# Patient Record
Sex: MALE | Race: WHITE | NOT HISPANIC OR LATINO
[De-identification: names, ages, dates, MRNs, and addresses within clinical notes are randomized per-mention and may not be internally consistent; named-entity substitution may affect disease eponyms.]

---

## 2018-10-26 ENCOUNTER — APPOINTMENT (OUTPATIENT)
Dept: OPHTHALMOLOGY | Facility: CLINIC | Age: 73
End: 2018-10-26
Payer: MEDICARE

## 2018-10-26 DIAGNOSIS — H52.202 UNSPECIFIED ASTIGMATISM, LEFT EYE: ICD-10-CM

## 2018-10-26 DIAGNOSIS — M19.90 UNSPECIFIED OSTEOARTHRITIS, UNSPECIFIED SITE: ICD-10-CM

## 2018-10-26 DIAGNOSIS — H49.21 SIXTH [ABDUCENT] NERVE PALSY, RIGHT EYE: ICD-10-CM

## 2018-10-26 DIAGNOSIS — H53.2 DIPLOPIA: ICD-10-CM

## 2018-10-26 DIAGNOSIS — H52.4 PRESBYOPIA: ICD-10-CM

## 2018-10-26 DIAGNOSIS — Z78.9 OTHER SPECIFIED HEALTH STATUS: ICD-10-CM

## 2018-10-26 DIAGNOSIS — H52.03 HYPERMETROPIA, BILATERAL: ICD-10-CM

## 2018-10-26 PROCEDURE — 99204 OFFICE O/P NEW MOD 45 MIN: CPT

## 2018-10-26 RX ORDER — PREDNISONE 1 MG/1
1 TABLET ORAL
Qty: 90 | Refills: 0 | Status: ACTIVE | COMMUNITY
Start: 2018-05-23

## 2018-10-26 RX ORDER — PREDNISONE 5 MG/1
5 TABLET ORAL
Qty: 90 | Refills: 0 | Status: ACTIVE | COMMUNITY
Start: 2018-07-10

## 2018-10-26 RX ORDER — DULOXETINE HYDROCHLORIDE 60 MG/1
60 CAPSULE, DELAYED RELEASE PELLETS ORAL
Qty: 90 | Refills: 0 | Status: ACTIVE | COMMUNITY
Start: 2018-08-01

## 2018-10-26 RX ORDER — RABEPRAZOLE SODIUM 20 MG/1
20 TABLET, DELAYED RELEASE ORAL
Qty: 60 | Refills: 0 | Status: ACTIVE | COMMUNITY
Start: 2018-08-02

## 2018-10-26 RX ORDER — FOLIC ACID-PYRIDOXINE-CYANOCOBALAMIN TAB 2.5-25-2 MG 2.5-25-2 MG
2.5-25-2 TAB ORAL
Qty: 90 | Refills: 0 | Status: ACTIVE | COMMUNITY
Start: 2018-06-21

## 2018-10-26 RX ORDER — TAMSULOSIN HYDROCHLORIDE 0.4 MG/1
0.4 CAPSULE ORAL
Qty: 90 | Refills: 0 | Status: ACTIVE | COMMUNITY
Start: 2018-05-16

## 2018-10-30 ENCOUNTER — APPOINTMENT (OUTPATIENT)
Dept: CARDIOLOGY | Facility: CLINIC | Age: 73
End: 2018-10-30
Payer: MEDICARE

## 2018-10-30 ENCOUNTER — NON-APPOINTMENT (OUTPATIENT)
Age: 73
End: 2018-10-30

## 2018-10-30 VITALS
HEIGHT: 73 IN | WEIGHT: 236 LBS | OXYGEN SATURATION: 96 % | BODY MASS INDEX: 31.28 KG/M2 | HEART RATE: 69 BPM | DIASTOLIC BLOOD PRESSURE: 79 MMHG | SYSTOLIC BLOOD PRESSURE: 155 MMHG

## 2018-10-30 PROCEDURE — 93000 ELECTROCARDIOGRAM COMPLETE: CPT

## 2018-10-30 PROCEDURE — 99205 OFFICE O/P NEW HI 60 MIN: CPT

## 2018-10-30 RX ORDER — CHROMIUM 200 MCG
1000 TABLET ORAL
Refills: 0 | Status: COMPLETED | COMMUNITY

## 2018-10-30 RX ORDER — IBANDRONATE SODIUM 150 MG/1
150 TABLET, FILM COATED ORAL
Refills: 0 | Status: COMPLETED | COMMUNITY

## 2018-11-19 ENCOUNTER — APPOINTMENT (OUTPATIENT)
Dept: CARDIOLOGY | Facility: CLINIC | Age: 73
End: 2018-11-19
Payer: MEDICARE

## 2018-11-19 PROCEDURE — 93306 TTE W/DOPPLER COMPLETE: CPT

## 2018-11-26 ENCOUNTER — APPOINTMENT (OUTPATIENT)
Dept: CARDIOLOGY | Facility: CLINIC | Age: 73
End: 2018-11-26
Payer: MEDICARE

## 2018-11-26 PROCEDURE — 93015 CV STRESS TEST SUPVJ I&R: CPT

## 2018-11-26 PROCEDURE — 78452 HT MUSCLE IMAGE SPECT MULT: CPT

## 2018-11-26 PROCEDURE — A9500: CPT

## 2018-11-30 DIAGNOSIS — R94.39 ABNORMAL RESULT OF OTHER CARDIOVASCULAR FUNCTION STUDY: ICD-10-CM

## 2018-12-10 ENCOUNTER — FORM ENCOUNTER (OUTPATIENT)
Age: 73
End: 2018-12-10

## 2018-12-11 ENCOUNTER — OUTPATIENT (OUTPATIENT)
Dept: OUTPATIENT SERVICES | Facility: HOSPITAL | Age: 73
LOS: 1 days | End: 2018-12-11
Payer: MEDICARE

## 2018-12-11 ENCOUNTER — APPOINTMENT (OUTPATIENT)
Dept: CARDIOLOGY | Facility: CLINIC | Age: 73
End: 2018-12-11

## 2018-12-11 DIAGNOSIS — R07.9 CHEST PAIN, UNSPECIFIED: ICD-10-CM

## 2018-12-11 PROCEDURE — 75574 CT ANGIO HRT W/3D IMAGE: CPT | Mod: 26

## 2018-12-11 PROCEDURE — 75574 CT ANGIO HRT W/3D IMAGE: CPT

## 2018-12-12 ENCOUNTER — APPOINTMENT (OUTPATIENT)
Dept: OPHTHALMOLOGY | Facility: CLINIC | Age: 73
End: 2018-12-12
Payer: MEDICARE

## 2018-12-12 PROCEDURE — 92060 SENSORIMOTOR EXAMINATION: CPT

## 2018-12-12 PROCEDURE — 92012 INTRM OPH EXAM EST PATIENT: CPT

## 2019-02-12 ENCOUNTER — APPOINTMENT (OUTPATIENT)
Dept: OPHTHALMOLOGY | Facility: CLINIC | Age: 74
End: 2019-02-12

## 2019-06-12 ENCOUNTER — MEDICATION RENEWAL (OUTPATIENT)
Age: 74
End: 2019-06-12

## 2019-06-12 RX ORDER — OLMESARTAN MEDOXOMIL 5 MG/1
5 TABLET, FILM COATED ORAL
Qty: 90 | Refills: 3 | Status: DISCONTINUED | COMMUNITY
Start: 2018-10-30 | End: 2019-06-12

## 2019-08-11 PROBLEM — H52.03 HYPERMETROPIA OF BOTH EYES: Status: ACTIVE | Noted: 2018-10-26

## 2019-09-27 ENCOUNTER — NON-APPOINTMENT (OUTPATIENT)
Age: 74
End: 2019-09-27

## 2019-09-27 ENCOUNTER — APPOINTMENT (OUTPATIENT)
Dept: CARDIOLOGY | Facility: CLINIC | Age: 74
End: 2019-09-27
Payer: MEDICARE

## 2019-09-27 VITALS
DIASTOLIC BLOOD PRESSURE: 72 MMHG | HEART RATE: 71 BPM | OXYGEN SATURATION: 94 % | BODY MASS INDEX: 32.6 KG/M2 | SYSTOLIC BLOOD PRESSURE: 136 MMHG | HEIGHT: 73 IN | WEIGHT: 246 LBS

## 2019-09-27 PROCEDURE — 93000 ELECTROCARDIOGRAM COMPLETE: CPT

## 2019-09-27 PROCEDURE — 99214 OFFICE O/P EST MOD 30 MIN: CPT

## 2019-09-27 RX ORDER — ASPIRIN ENTERIC COATED TABLETS 81 MG 81 MG/1
81 TABLET, DELAYED RELEASE ORAL
Refills: 0 | Status: DISCONTINUED | COMMUNITY
End: 2019-09-27

## 2019-09-27 NOTE — PHYSICAL EXAM
[Well Groomed] : well groomed [General Appearance - In No Acute Distress] : no acute distress [Normal Conjunctiva] : the conjunctiva exhibited no abnormalities [Eyelids - No Xanthelasma] : the eyelids demonstrated no xanthelasmas [No Oral Pallor] : no oral pallor [Normal Oral Mucosa] : normal oral mucosa [No Oral Cyanosis] : no oral cyanosis [Normal Jugular Venous A Waves Present] : normal jugular venous A waves present [Normal Jugular Venous V Waves Present] : normal jugular venous V waves present [No Jugular Venous Lopez A Waves] : no jugular venous lopez A waves [Respiration, Rhythm And Depth] : normal respiratory rhythm and effort [Exaggerated Use Of Accessory Muscles For Inspiration] : no accessory muscle use [Abdomen Soft] : soft [Auscultation Breath Sounds / Voice Sounds] : lungs were clear to auscultation bilaterally [Abdomen Tenderness] : non-tender [Abdomen Mass (___ Cm)] : no abdominal mass palpated [Gait - Sufficient For Exercise Testing] : the gait was sufficient for exercise testing [Abnormal Walk] : normal gait [Nail Clubbing] : no clubbing of the fingernails [Cyanosis, Localized] : no localized cyanosis [Petechial Hemorrhages (___cm)] : no petechial hemorrhages [No Venous Stasis] : no venous stasis [Skin Color & Pigmentation] : normal skin color and pigmentation [] : no rash [Skin Lesions] : no skin lesions [No Xanthoma] : no  xanthoma was observed [No Skin Ulcers] : no skin ulcer [Oriented To Time, Place, And Person] : oriented to person, place, and time [Mood] : the mood was normal [Affect] : the affect was normal [No Anxiety] : not feeling anxious [Rhythm Regular] : regular [Normal Rate] : normal [Normal S2] : normal S2 [Normal S1] : normal S1 [I] : a grade 1 [Right Carotid Bruit] : no bruit heard over the right carotid [Left Carotid Bruit] : no bruit heard over the left carotid [2+] : left 2+ [No Pitting Edema] : no pitting edema present [Bruit] : no bruit heard

## 2019-09-27 NOTE — REVIEW OF SYSTEMS
[Dyspnea on exertion] : dyspnea during exertion [Joint Stiffness] : joint stiffness [Joint Pain] : joint pain [Negative] : Heme/Lymph

## 2019-09-27 NOTE — HISTORY OF PRESENT ILLNESS
[FreeTextEntry1] : 74 year old man with a history of PMR on steroids, knee arthritis, GERD, aspiration, hiatal hernia, double vision, HTN.\par \par He had a nuclear stress test in 2018 with mild mid anterior ischemia. He underwent a coronary CT which was remarkable for CAD that was <30%. He had an echo in 11/2018 that showed normal systolic LV function without any significant other findings, including no significant valvular disease. He had a  carotid doppler that showed no flow limiting lesion. \par \par Since his last visit, he has been feeling well. \par He is still complaining of dyspnea on exertion especially with going up one flight of stairs.  He corie remained very sedentary. \par He   denies any chest pain, PND, orthopnea,  near syncope, syncope, strokelike symptoms, lower extremity edema, palpitations. He denies any blurry vision, headaches. He is compliant with his medications. \par  \par

## 2019-09-27 NOTE — DISCUSSION/SUMMARY
[FreeTextEntry1] : 74 year man with a history as listed presents for an dyspnea cardiac evaluation. \par \par Esvin is complaining of dyspnea on exertion which is related to deconditioning. He denies any anginal symptoms. Clinically he is euvolemic on exam. His EKG did not reveal any significant ischemic changes. \par  \par He had a nuclear stress test in 2018 with mild mid anterior ischemia. He underwent a coronary CT which was remarkable for CAD that was <30%. He had an echo in 11/2018 that showed normal systolic LV function without any significant other findings, including no significant valvular disease. He had a  carotid doppler that showed no flow limiting lesion. \par \par He will continue with statin therapy to achieve maintain goal LDL<100 or ideally <70. He will continue lipitor 10mg Hs for now. At your convenience, please fax me his latest lab results including lipid profile. \par \par His blood pressure is better controlled. He will continue Losartan 25mg Qday. \par  \par He is >72 and on ASA for primary prevention, he will stop it for now. \par \par Exercise and diet counseling was performed in order to reduce her future cardiovascular risk. \par He will followup with me in 6 months or sooner if necessary.

## 2019-09-30 ENCOUNTER — RX RENEWAL (OUTPATIENT)
Age: 74
End: 2019-09-30

## 2019-12-30 ENCOUNTER — APPOINTMENT (OUTPATIENT)
Dept: INTERNAL MEDICINE | Facility: CLINIC | Age: 74
End: 2019-12-30
Payer: MEDICARE

## 2019-12-30 VITALS
BODY MASS INDEX: 32.47 KG/M2 | HEIGHT: 73 IN | DIASTOLIC BLOOD PRESSURE: 75 MMHG | SYSTOLIC BLOOD PRESSURE: 110 MMHG | WEIGHT: 245 LBS

## 2019-12-30 PROCEDURE — 99204 OFFICE O/P NEW MOD 45 MIN: CPT

## 2019-12-30 RX ORDER — AMOXICILLIN AND CLAVULANATE POTASSIUM 875; 125 MG/1; MG/1
875-125 TABLET, COATED ORAL
Qty: 14 | Refills: 0 | Status: ACTIVE | COMMUNITY
Start: 2019-12-30 | End: 1900-01-01

## 2019-12-30 NOTE — PHYSICAL EXAM
[General Appearance - In No Acute Distress] : in no acute distress [General Appearance - Alert] : alert [PERRL With Normal Accommodation] : pupils were equal in size, round, reactive to light [Sclera] : the sclera and conjunctiva were normal [Extraocular Movements] : extraocular movements were intact [FreeTextEntry1] : LEFT FACIAL TENDERNESS ALONG MASSETER MUSCLE DIFFICULTY OPENING MOUTH ALL THE WAY [Neck Appearance] : the appearance of the neck was normal [Neck Cervical Mass (___cm)] : no neck mass was observed [Jugular Venous Distention Increased] : there was no jugular-venous distention [Thyroid Diffuse Enlargement] : the thyroid was not enlarged [Auscultation Breath Sounds / Voice Sounds] : lungs were clear to auscultation bilaterally [Heart Rate And Rhythm] : heart rate was normal and rhythm regular [Heart Sounds] : normal S1 and S2 [Heart Sounds Gallop] : no gallops [Heart Sounds Pericardial Friction Rub] : no pericardial rub [Murmurs] : no murmurs [Full Pulse] : the pedal pulses are present [Edema] : there was no peripheral edema [Bowel Sounds] : normal bowel sounds [Abdomen Soft] : soft [Abdomen Tenderness] : non-tender [Abdomen Mass (___ Cm)] : no abdominal mass palpated [Costovertebral Angle Tenderness] : no CVA tenderness [No Palpable Adenopathy] : no palpable adenopathy [Musculoskeletal - Swelling] : no joint swelling [Motor Tone] : muscle strength and tone were normal [Nail Clubbing] : no clubbing  or cyanosis of the fingernails [Skin Color & Pigmentation] : normal skin color and pigmentation [Deep Tendon Reflexes (DTR)] : deep tendon reflexes were 2+ and symmetric [] : no rash [Sensation] : the sensory exam was normal to light touch and pinprick [No Focal Deficits] : no focal deficits [Oriented To Time, Place, And Person] : oriented to person, place, and time [Affect] : the affect was normal

## 2019-12-30 NOTE — HISTORY OF PRESENT ILLNESS
[FreeTextEntry1] : 73YO MALE  WITH HX OF PMR ON CHRONIC STEROIDS  WHO UNDER WENT  LEFT TOOTH EXTRACTION ABOUT FIVE WEEKS AGO DEVELOPED PAIN ABOUT A WEEK AGO WITH JAW PAIN GIVEN ABX AND FLEXERIL NOW WITH MRI WITH CONCERN FOR ABSCESS  AND OSTEOMYELITIS \par OF MANDIBLE NO FEVERS NO CHILLS \par

## 2019-12-30 NOTE — ASSESSMENT
[FreeTextEntry1] : 75YO MALE  PMH OF PMR ON STEROIDS WHO HAD A TOOTH EXTRACTION AND DEVELOPED INCREASED SWELLING OF RIGHT SIDE OF FACE AND JAW DIFFICULTY OPENING MOUTH ALL THE WAY\par  MRI WITH QUESTION OF MYOSITIS AND POSSIBLE OSTEOMYELITIS OF JAW AND 8MM ABSCESS\par ON PHYSICAL  EXAM SOME LIMITED OPENING OF MOUTH ON LEFT  SIDE AND SOME TENDERNESS OVER  LEFT MASSETER\par WILL RECOMMEND TREATMENT FOR PRESUMED OSTEOMYELITIS WILL RX INVANZ 1 Q 24\par FOR 6 WEEKS \par WILL ARRANGE PICC  LINE  PLACEMENT \par WILL RX AUGMENITN UNTIL IV CAN BE PLACED \par SED RATE LITTLE VALUE IN PT  WITH HX OF PMR

## 2020-01-01 ENCOUNTER — FORM ENCOUNTER (OUTPATIENT)
Age: 75
End: 2020-01-01

## 2020-01-02 ENCOUNTER — OUTPATIENT (OUTPATIENT)
Dept: OUTPATIENT SERVICES | Facility: HOSPITAL | Age: 75
LOS: 1 days | End: 2020-01-02
Payer: MEDICARE

## 2020-01-02 DIAGNOSIS — M27.2 INFLAMMATORY CONDITIONS OF JAWS: ICD-10-CM

## 2020-01-02 PROCEDURE — 76942 ECHO GUIDE FOR BIOPSY: CPT

## 2020-01-02 PROCEDURE — 36573 INSJ PICC RS&I 5 YR+: CPT

## 2020-01-02 PROCEDURE — 77001 FLUOROGUIDE FOR VEIN DEVICE: CPT

## 2020-01-02 PROCEDURE — 36584 COMPL RPLCMT PICC RS&I: CPT

## 2020-01-02 PROCEDURE — C1751: CPT

## 2020-01-13 ENCOUNTER — APPOINTMENT (OUTPATIENT)
Dept: INTERNAL MEDICINE | Facility: CLINIC | Age: 75
End: 2020-01-13
Payer: MEDICARE

## 2020-01-13 VITALS
WEIGHT: 245 LBS | HEIGHT: 73 IN | SYSTOLIC BLOOD PRESSURE: 130 MMHG | BODY MASS INDEX: 32.47 KG/M2 | DIASTOLIC BLOOD PRESSURE: 65 MMHG

## 2020-01-13 PROCEDURE — 99214 OFFICE O/P EST MOD 30 MIN: CPT

## 2020-01-13 NOTE — ASSESSMENT
[FreeTextEntry1] : 73YO MALE  PMH OF PMR ON STEROIDS WHO HAD A TOOTH EXTRACTION AND DEVELOPED INCREASED SWELLING OF RIGHT SIDE OF FACE AND JAW DIFFICULTY OPENING MOUTH ALL THE WAY\par  MRI WITH QUESTION OF MYOSITIS AND POSSIBLE OSTEOMYELITIS OF JAW AND 8MM ABSCESS\par ON PHYSICAL  EXAM SOME LIMITED OPENING OF MOUTH ON LEFT  SIDE AND SOME TENDERNESS OVER  LEFT MASSETER\par WON TREATMENT FOR PRESUMED OSTEOMYELITIS WILL RX INVANZ 1 Q 24\par FOR 6 WEEKS \par   PICC  LINE  PLACED\par  \par SED RATE LITTLE VALUE IN PT  WITH HX OF PMR\par OTHERS LABS REVIEWED WITH PT None

## 2020-01-13 NOTE — HISTORY OF PRESENT ILLNESS
[FreeTextEntry1] : 73YO MALE  WITH HX OF PMR ON CHRONIC STEROIDS  WHO UNDER WENT  LEFT TOOTH EXTRACTION ABOUT FIVE WEEKS AGO DEVELOPED PAIN ABOUT A WEEK AGO WITH JAW PAIN GIVEN ABX AND FLEXERIL NOW WITH MRI WITH CONCERN FOR ABSCESS  AND OSTEOMYELITIS \par OF MANDIBLE NO FEVERS NO CHILLS \par PT PLACED ON IV ABX INVANZ AND FEELING BETER HERE FOR FOLLOWUP\par

## 2020-01-13 NOTE — PHYSICAL EXAM
[General Appearance - Alert] : alert [Sclera] : the sclera and conjunctiva were normal [General Appearance - In No Acute Distress] : in no acute distress [PERRL With Normal Accommodation] : pupils were equal in size, round, reactive to light [Extraocular Movements] : extraocular movements were intact [FreeTextEntry1] : LEFT FACIAL NON TENDER   LESS  DIFFICULTY OPENING MOUTH ALL THE WAY [Neck Appearance] : the appearance of the neck was normal [Neck Cervical Mass (___cm)] : no neck mass was observed [Jugular Venous Distention Increased] : there was no jugular-venous distention [Thyroid Diffuse Enlargement] : the thyroid was not enlarged [Auscultation Breath Sounds / Voice Sounds] : lungs were clear to auscultation bilaterally [Heart Rate And Rhythm] : heart rate was normal and rhythm regular [Heart Sounds] : normal S1 and S2 [Heart Sounds Gallop] : no gallops [Murmurs] : no murmurs [Heart Sounds Pericardial Friction Rub] : no pericardial rub [Full Pulse] : the pedal pulses are present [Edema] : there was no peripheral edema [Bowel Sounds] : normal bowel sounds [Abdomen Soft] : soft [Abdomen Tenderness] : non-tender [Abdomen Mass (___ Cm)] : no abdominal mass palpated [Costovertebral Angle Tenderness] : no CVA tenderness [No Palpable Adenopathy] : no palpable adenopathy [Musculoskeletal - Swelling] : no joint swelling [Nail Clubbing] : no clubbing  or cyanosis of the fingernails [Motor Tone] : muscle strength and tone were normal [Skin Color & Pigmentation] : normal skin color and pigmentation [] : no rash [Deep Tendon Reflexes (DTR)] : deep tendon reflexes were 2+ and symmetric [Sensation] : the sensory exam was normal to light touch and pinprick [No Focal Deficits] : no focal deficits [Oriented To Time, Place, And Person] : oriented to person, place, and time [Affect] : the affect was normal

## 2020-02-03 ENCOUNTER — APPOINTMENT (OUTPATIENT)
Dept: INTERNAL MEDICINE | Facility: CLINIC | Age: 75
End: 2020-02-03
Payer: MEDICARE

## 2020-02-03 VITALS
BODY MASS INDEX: 31.14 KG/M2 | WEIGHT: 235 LBS | SYSTOLIC BLOOD PRESSURE: 110 MMHG | HEIGHT: 73 IN | DIASTOLIC BLOOD PRESSURE: 70 MMHG

## 2020-02-03 DIAGNOSIS — Z79.2 LONG TERM (CURRENT) USE OF ANTIBIOTICS: ICD-10-CM

## 2020-02-03 DIAGNOSIS — M27.2 INFLAMMATORY CONDITIONS OF JAWS: ICD-10-CM

## 2020-02-03 DIAGNOSIS — M60.9 MYOSITIS, UNSPECIFIED: ICD-10-CM

## 2020-02-03 PROCEDURE — 99214 OFFICE O/P EST MOD 30 MIN: CPT

## 2020-02-03 NOTE — ASSESSMENT
[FreeTextEntry1] : 73YO MALE  PMH OF PMR ON STEROIDS WHO HAD A TOOTH EXTRACTION AND DEVELOPED INCREASED SWELLING OF RIGHT SIDE OF FACE AND JAW DIFFICULTY OPENING MOUTH ALL THE WAY\par  MRI WITH QUESTION OF MYOSITIS AND POSSIBLE OSTEOMYELITIS OF JAW AND 8MM ABSCESS\par ON PHYSICAL  EXAM IMPROVED  OPENING OF MOUTH ON LEFT  SIDE DECREASED  TENDERNESS OVER  LEFT MASSETER\par  ON TREATMENT FOR PRESUMED OSTEOMYELITIS WILL RX INVANZ 1 Q 24\par FOR 6 WEEKS THROUGH 2/13\par    VIA PICC  LINE   \par PLAN REPEAT MRI   AND LIKELY COMPLETE ABX 2/12 \par WILL FOLLOW UP WITH DR BRYSON RX FOR MRI GIVEN\par

## 2020-02-03 NOTE — PHYSICAL EXAM
[General Appearance - Alert] : alert [General Appearance - In No Acute Distress] : in no acute distress [Sclera] : the sclera and conjunctiva were normal [PERRL With Normal Accommodation] : pupils were equal in size, round, reactive to light [Extraocular Movements] : extraocular movements were intact [Neck Appearance] : the appearance of the neck was normal [Jugular Venous Distention Increased] : there was no jugular-venous distention [Neck Cervical Mass (___cm)] : no neck mass was observed [Auscultation Breath Sounds / Voice Sounds] : lungs were clear to auscultation bilaterally [Thyroid Diffuse Enlargement] : the thyroid was not enlarged [Heart Rate And Rhythm] : heart rate was normal and rhythm regular [Heart Sounds] : normal S1 and S2 [Heart Sounds Gallop] : no gallops [Murmurs] : no murmurs [Heart Sounds Pericardial Friction Rub] : no pericardial rub [Full Pulse] : the pedal pulses are present [Edema] : there was no peripheral edema [Bowel Sounds] : normal bowel sounds [Abdomen Soft] : soft [Abdomen Tenderness] : non-tender [Abdomen Mass (___ Cm)] : no abdominal mass palpated [Costovertebral Angle Tenderness] : no CVA tenderness [Musculoskeletal - Swelling] : no joint swelling [No Palpable Adenopathy] : no palpable adenopathy [Nail Clubbing] : no clubbing  or cyanosis of the fingernails [Motor Tone] : muscle strength and tone were normal [] : no rash [Skin Color & Pigmentation] : normal skin color and pigmentation [Sensation] : the sensory exam was normal to light touch and pinprick [No Focal Deficits] : no focal deficits [Deep Tendon Reflexes (DTR)] : deep tendon reflexes were 2+ and symmetric [Oriented To Time, Place, And Person] : oriented to person, place, and time [Affect] : the affect was normal [FreeTextEntry1] : LEFT FACIAL NON TENDER   LESS  DIFFICULTY OPENING MOUTH ALL THE WAY

## 2020-02-03 NOTE — HISTORY OF PRESENT ILLNESS
[FreeTextEntry1] : 75YO MALE  WITH HX OF PMR ON CHRONIC STEROIDS  WHO UNDER WENT  LEFT TOOTH EXTRACTION   DEVELOPED PAIN ABOUT A WEEK AGO WITH JAW PAIN GIVEN ABX AND FLEXERIL NOW WITH MRI WITH CONCERN FOR ABSCESS  AND OSTEOMYELITIS \par OF MANDIBLE NO FEVERS NO CHILLS \par PT PLACED ON IV ABX INVANZ AND FEELING BETER HERE NO CP NO SOB NO FEVERS\par

## 2020-04-02 PROBLEM — H49.21 SIXTH NERVE PALSY OF RIGHT EYE: Status: ACTIVE | Noted: 2018-10-26

## 2020-06-03 ENCOUNTER — APPOINTMENT (OUTPATIENT)
Dept: CARDIOLOGY | Facility: CLINIC | Age: 75
End: 2020-06-03
Payer: MEDICARE

## 2020-06-03 ENCOUNTER — NON-APPOINTMENT (OUTPATIENT)
Age: 75
End: 2020-06-03

## 2020-06-03 VITALS
DIASTOLIC BLOOD PRESSURE: 78 MMHG | SYSTOLIC BLOOD PRESSURE: 134 MMHG | WEIGHT: 242 LBS | BODY MASS INDEX: 32.07 KG/M2 | HEIGHT: 73 IN | HEART RATE: 71 BPM | OXYGEN SATURATION: 95 %

## 2020-06-03 PROCEDURE — 99214 OFFICE O/P EST MOD 30 MIN: CPT

## 2020-06-03 PROCEDURE — 93000 ELECTROCARDIOGRAM COMPLETE: CPT

## 2020-06-03 NOTE — PHYSICAL EXAM
[Well Groomed] : well groomed [General Appearance - In No Acute Distress] : no acute distress [Normal Conjunctiva] : the conjunctiva exhibited no abnormalities [Eyelids - No Xanthelasma] : the eyelids demonstrated no xanthelasmas [Normal Oral Mucosa] : normal oral mucosa [No Oral Pallor] : no oral pallor [No Oral Cyanosis] : no oral cyanosis [Normal Jugular Venous A Waves Present] : normal jugular venous A waves present [Normal Jugular Venous V Waves Present] : normal jugular venous V waves present [No Jugular Venous Lopez A Waves] : no jugular venous lopez A waves [Respiration, Rhythm And Depth] : normal respiratory rhythm and effort [Exaggerated Use Of Accessory Muscles For Inspiration] : no accessory muscle use [Auscultation Breath Sounds / Voice Sounds] : lungs were clear to auscultation bilaterally [Abdomen Soft] : soft [Abdomen Tenderness] : non-tender [Abdomen Mass (___ Cm)] : no abdominal mass palpated [Abnormal Walk] : normal gait [Gait - Sufficient For Exercise Testing] : the gait was sufficient for exercise testing [Nail Clubbing] : no clubbing of the fingernails [Cyanosis, Localized] : no localized cyanosis [Petechial Hemorrhages (___cm)] : no petechial hemorrhages [Skin Color & Pigmentation] : normal skin color and pigmentation [] : no rash [No Venous Stasis] : no venous stasis [Skin Lesions] : no skin lesions [No Skin Ulcers] : no skin ulcer [No Xanthoma] : no  xanthoma was observed [Oriented To Time, Place, And Person] : oriented to person, place, and time [Affect] : the affect was normal [Mood] : the mood was normal [No Anxiety] : not feeling anxious [Normal Rate] : normal [Rhythm Regular] : regular [Normal S1] : normal S1 [Normal S2] : normal S2 [I] : a grade 1 [2+] : left 2+ [No Pitting Edema] : no pitting edema present [Right Carotid Bruit] : no bruit heard over the right carotid [Left Carotid Bruit] : no bruit heard over the left carotid [Bruit] : no bruit heard

## 2020-06-03 NOTE — REVIEW OF SYSTEMS
[Joint Pain] : joint pain [Dyspnea on exertion] : dyspnea during exertion [Joint Stiffness] : joint stiffness [Negative] : Heme/Lymph

## 2020-06-03 NOTE — HISTORY OF PRESENT ILLNESS
[FreeTextEntry1] : 74 year old man with a history of PMR on steroids, knee arthritis, GERD, aspiration, hiatal hernia, double vision, HTN.\par He had a nuclear stress test in 2018 with mild mid anterior ischemia. He underwent a coronary CT which was remarkable for CAD that was <30%. He had an echo in 11/2018 that showed normal systolic LV function without any significant other findings, including no significant valvular disease. He had a  carotid doppler that showed no flow limiting lesion. \par \par Since his last visit, he has been having more issues with PMR. His prednisone was increased but he still is in pain. \par In 1/2020 he had osteomyelitis of the jaw and was treated with antibiotic for 6 weeks. \par \par He is still complaining of dyspnea on exertion especially with going up one flight of stairs.  This is unchanged from previous. He has remained  sedentary. he walks the the dog around the block without symptoms. \par He   denies any chest pain, PND, orthopnea,  near syncope, syncope, strokelike symptoms, lower extremity edema, palpitations. He denies any blurry vision, headaches. He is compliant with his medications. \par  \par

## 2020-06-03 NOTE — DISCUSSION/SUMMARY
[FreeTextEntry1] : 74 year man with a history as listed presents for a cardiac evaluation. \par \par Esvin is complaining of dyspnea on exertion which is related to deconditioning. He denies any anginal symptoms. Clinically he is euvolemic on exam. His EKG did not reveal any significant ischemic changes. \par  He had a nuclear stress test in 2018 with mild mid anterior ischemia. He underwent a coronary CT which was remarkable for CAD that was <30%.  He had a  carotid doppler that showed no flow limiting lesion. \par He will get a 2d echo to assess for any  new structural heart disease, changes in valvular and ventricular function. \par \par He will continue with statin therapy to achieve maintain goal LDL<100 or ideally <70. He will continue lipitor 10mg Hs for now. At your convenience, please fax me his latest lab results including lipid profile. \par \par His blood pressure is better controlled. He will continue Losartan 25mg Qday. \par  \par He is >72 and on ASA for primary prevention, he will stop it for now. \par \par Exercise and diet counseling was performed in order to reduce her future cardiovascular risk. \par He will followup with me in 6 months or sooner if necessary.

## 2020-07-07 ENCOUNTER — APPOINTMENT (OUTPATIENT)
Dept: DISASTER EMERGENCY | Facility: CLINIC | Age: 75
End: 2020-07-07

## 2020-07-07 DIAGNOSIS — Z01.818 ENCOUNTER FOR OTHER PREPROCEDURAL EXAMINATION: ICD-10-CM

## 2020-07-08 LAB — SARS-COV-2 N GENE NPH QL NAA+PROBE: NOT DETECTED

## 2020-07-09 ENCOUNTER — TRANSCRIPTION ENCOUNTER (OUTPATIENT)
Age: 75
End: 2020-07-09

## 2020-07-10 ENCOUNTER — OUTPATIENT (OUTPATIENT)
Dept: OUTPATIENT SERVICES | Facility: HOSPITAL | Age: 75
LOS: 1 days | End: 2020-07-10
Payer: MEDICARE

## 2020-07-10 DIAGNOSIS — D50.0 IRON DEFICIENCY ANEMIA SECONDARY TO BLOOD LOSS (CHRONIC): ICD-10-CM

## 2020-07-10 PROCEDURE — 45378 DIAGNOSTIC COLONOSCOPY: CPT

## 2020-09-28 ENCOUNTER — RX RENEWAL (OUTPATIENT)
Age: 75
End: 2020-09-28

## 2020-10-22 ENCOUNTER — APPOINTMENT (OUTPATIENT)
Dept: CARDIOLOGY | Facility: CLINIC | Age: 75
End: 2020-10-22
Payer: MEDICARE

## 2020-10-22 ENCOUNTER — NON-APPOINTMENT (OUTPATIENT)
Age: 75
End: 2020-10-22

## 2020-10-22 VITALS
HEIGHT: 73 IN | DIASTOLIC BLOOD PRESSURE: 76 MMHG | BODY MASS INDEX: 31.54 KG/M2 | OXYGEN SATURATION: 95 % | SYSTOLIC BLOOD PRESSURE: 123 MMHG | HEART RATE: 66 BPM | WEIGHT: 238 LBS

## 2020-10-22 PROCEDURE — 93000 ELECTROCARDIOGRAM COMPLETE: CPT

## 2020-10-22 PROCEDURE — 99214 OFFICE O/P EST MOD 30 MIN: CPT

## 2020-10-22 NOTE — PHYSICAL EXAM
[Well Groomed] : well groomed [General Appearance - In No Acute Distress] : no acute distress [Normal Conjunctiva] : the conjunctiva exhibited no abnormalities [Eyelids - No Xanthelasma] : the eyelids demonstrated no xanthelasmas [Normal Oral Mucosa] : normal oral mucosa [No Oral Pallor] : no oral pallor [No Oral Cyanosis] : no oral cyanosis [Normal Jugular Venous A Waves Present] : normal jugular venous A waves present [Normal Jugular Venous V Waves Present] : normal jugular venous V waves present [No Jugular Venous Lopez A Waves] : no jugular venous lopez A waves [Respiration, Rhythm And Depth] : normal respiratory rhythm and effort [Exaggerated Use Of Accessory Muscles For Inspiration] : no accessory muscle use [Auscultation Breath Sounds / Voice Sounds] : lungs were clear to auscultation bilaterally [Abdomen Soft] : soft [Abdomen Tenderness] : non-tender [Abdomen Mass (___ Cm)] : no abdominal mass palpated [Abnormal Walk] : normal gait [Gait - Sufficient For Exercise Testing] : the gait was sufficient for exercise testing [Nail Clubbing] : no clubbing of the fingernails [Cyanosis, Localized] : no localized cyanosis [Petechial Hemorrhages (___cm)] : no petechial hemorrhages [Skin Color & Pigmentation] : normal skin color and pigmentation [] : no rash [No Venous Stasis] : no venous stasis [Skin Lesions] : no skin lesions [No Skin Ulcers] : no skin ulcer [No Xanthoma] : no  xanthoma was observed [Oriented To Time, Place, And Person] : oriented to person, place, and time [Affect] : the affect was normal [Mood] : the mood was normal [No Anxiety] : not feeling anxious [Normal Rate] : normal [Rhythm Regular] : regular [Normal S1] : normal S1 [Normal S2] : normal S2 [I] : a grade 1 [Right Carotid Bruit] : no bruit heard over the right carotid [Left Carotid Bruit] : no bruit heard over the left carotid [2+] : left 2+ [Bruit] : no bruit heard [No Pitting Edema] : no pitting edema present

## 2020-10-22 NOTE — HISTORY OF PRESENT ILLNESS
[FreeTextEntry1] : 75 year old man with a history of PMR on steroids, knee arthritis, GERD, aspiration, hiatal hernia, double vision, HTN.\par He had a nuclear stress test in 2018 with mild mid anterior ischemia. He underwent a coronary CT which was remarkable for CAD that was <30%. He had an echo in 11/2018 that showed normal systolic LV function without any significant other findings, including no significant valvular disease. He had a  carotid doppler that showed no flow limiting lesion. \par \par Since his last visit, he is feeling better. His prednisone dose has decreased. \par He is still complaining of dyspnea on exertion especially with going up one flight of stairs.  This is unchanged from previous. He has remained  sedentary. he walks the the dog around the block without symptoms. \par He   denies any chest pain, PND, orthopnea,  near syncope, syncope, strokelike symptoms, lower extremity edema, palpitations. He denies any blurry vision, headaches. He is compliant with his medications. \par  \par

## 2020-10-22 NOTE — DISCUSSION/SUMMARY
[FreeTextEntry1] : 75 year man with a history as listed presents for a cardiac evaluation. \par \par Esvin is complaining of dyspnea on exertion which is related to deconditioning. He denies any anginal symptoms. Clinically he is euvolemic on exam. His EKG did not reveal any significant ischemic changes. \par  He had a nuclear stress test in 2018 with mild mid anterior ischemia. He underwent a coronary CT in 3/2019 which was remarkable for CAD that was <30%.  He had a  carotid doppler that showed no flow limiting lesion. He will continue with statin therapy to achieve maintain goal LDL<100 or ideally <70. He will continue lipitor 10mg Hs for now.\par \par His blood pressure is better controlled. He will continue Losartan 25mg Qday.  \par \par  At your convenience, please fax me his latest lab results including lipid profile. \par \par He is >72 and on ASA for primary prevention, he will stop it for now. \par \par Exercise and diet counseling was performed in order to reduce her future cardiovascular risk. \par He will followup with me in 6 months or sooner if necessary.

## 2021-06-18 ENCOUNTER — RX RENEWAL (OUTPATIENT)
Age: 76
End: 2021-06-18

## 2021-09-06 ENCOUNTER — TRANSCRIPTION ENCOUNTER (OUTPATIENT)
Age: 76
End: 2021-09-06

## 2021-09-20 ENCOUNTER — RX RENEWAL (OUTPATIENT)
Age: 76
End: 2021-09-20

## 2021-10-22 ENCOUNTER — APPOINTMENT (OUTPATIENT)
Dept: CARDIOLOGY | Facility: CLINIC | Age: 76
End: 2021-10-22
Payer: MEDICARE

## 2021-10-22 ENCOUNTER — NON-APPOINTMENT (OUTPATIENT)
Age: 76
End: 2021-10-22

## 2021-10-22 VITALS — DIASTOLIC BLOOD PRESSURE: 70 MMHG | SYSTOLIC BLOOD PRESSURE: 110 MMHG

## 2021-10-22 VITALS
DIASTOLIC BLOOD PRESSURE: 68 MMHG | HEART RATE: 77 BPM | HEIGHT: 73 IN | OXYGEN SATURATION: 96 % | SYSTOLIC BLOOD PRESSURE: 134 MMHG | WEIGHT: 235 LBS | BODY MASS INDEX: 31.14 KG/M2

## 2021-10-22 PROCEDURE — 99214 OFFICE O/P EST MOD 30 MIN: CPT

## 2021-10-22 PROCEDURE — 93000 ELECTROCARDIOGRAM COMPLETE: CPT

## 2021-10-22 NOTE — DISCUSSION/SUMMARY
[FreeTextEntry1] : 76 year man with a history as listed presents for a cardiac evaluation. \par \par Esvin is complaining of dyspnea on exertion which is related to deconditioning. His EKG did not reveal any significant ischemic changes. He will undergo a pharmacological nuclear stress test to assess for underlying obstructive CAD.  He will get a 2d echo to assess for any  new structural heart disease, changes in valvular and ventricular function. \par \par He had a  carotid doppler that showed no flow limiting lesion. \par \par He will continue with statin therapy to achieve maintain goal LDL<100 or ideally <70. He will continue lipitor 10mg  \par \par His blood pressure is better controlled. He will continue Losartan 25mg Qday.  \par \par  At your convenience, please fax me his latest lab results including lipid profile. \par \par Exercise and diet counseling was performed in order to reduce her future cardiovascular risk. \par He will followup with me in 6 months or sooner if necessary.

## 2021-10-22 NOTE — HISTORY OF PRESENT ILLNESS
[FreeTextEntry1] : 76year old man with a history of PMR on steroids, knee arthritis, GERD, aspiration, hiatal hernia, double vision, HTN.\par He had a nuclear stress test in 2018 with mild mid anterior ischemia. He underwent a coronary CT which was remarkable for CAD that was <30%. He had an echo in 11/2018 that showed normal systolic LV function without any significant other findings, including no significant valvular disease. He had a  carotid doppler that showed no flow limiting lesion. \par \par Since his last visit, he lost his wife. He has been very sad. He has been eating normal. \par He is still complaining of dyspnea on exertion especially with going up one flight of stairs.  This is unchanged from previous. He has remained  sedentary. he walks the the dog around the block without symptoms. \par He   denies any chest pain, PND, orthopnea,  near syncope, syncope, strokelike symptoms, lower extremity edema, palpitations. He denies any blurry vision, headaches. He is compliant with his medications. \par  \par

## 2021-10-22 NOTE — CARDIOLOGY SUMMARY
[de-identified] : 10/22/21 Sinus  Rhythm  -First degree A-V block \par -Old anteroseptal infarct. \par

## 2021-10-28 ENCOUNTER — APPOINTMENT (OUTPATIENT)
Dept: CARDIOLOGY | Facility: CLINIC | Age: 76
End: 2021-10-28
Payer: MEDICARE

## 2021-10-28 PROCEDURE — 93306 TTE W/DOPPLER COMPLETE: CPT

## 2021-11-17 ENCOUNTER — RX RENEWAL (OUTPATIENT)
Age: 76
End: 2021-11-17

## 2021-12-12 ENCOUNTER — TRANSCRIPTION ENCOUNTER (OUTPATIENT)
Age: 76
End: 2021-12-12

## 2021-12-14 ENCOUNTER — RX RENEWAL (OUTPATIENT)
Age: 76
End: 2021-12-14

## 2022-01-11 ENCOUNTER — APPOINTMENT (OUTPATIENT)
Dept: CARDIOLOGY | Facility: CLINIC | Age: 77
End: 2022-01-11

## 2022-01-20 ENCOUNTER — APPOINTMENT (OUTPATIENT)
Dept: CARDIOLOGY | Facility: CLINIC | Age: 77
End: 2022-01-20
Payer: MEDICARE

## 2022-01-20 PROCEDURE — 93015 CV STRESS TEST SUPVJ I&R: CPT

## 2022-01-21 ENCOUNTER — NON-APPOINTMENT (OUTPATIENT)
Age: 77
End: 2022-01-21

## 2022-01-21 ENCOUNTER — APPOINTMENT (OUTPATIENT)
Dept: CARDIOLOGY | Facility: CLINIC | Age: 77
End: 2022-01-21
Payer: MEDICARE

## 2022-01-21 VITALS
BODY MASS INDEX: 31.28 KG/M2 | HEIGHT: 73 IN | DIASTOLIC BLOOD PRESSURE: 71 MMHG | OXYGEN SATURATION: 95 % | WEIGHT: 236 LBS | HEART RATE: 75 BPM | SYSTOLIC BLOOD PRESSURE: 152 MMHG

## 2022-01-21 VITALS — DIASTOLIC BLOOD PRESSURE: 70 MMHG | SYSTOLIC BLOOD PRESSURE: 118 MMHG

## 2022-01-21 DIAGNOSIS — R94.39 ABNORMAL RESULT OF OTHER CARDIOVASCULAR FUNCTION STUDY: ICD-10-CM

## 2022-01-21 PROCEDURE — 93000 ELECTROCARDIOGRAM COMPLETE: CPT

## 2022-01-21 PROCEDURE — 99215 OFFICE O/P EST HI 40 MIN: CPT

## 2022-01-21 NOTE — CARDIOLOGY SUMMARY
[de-identified] : 10/22/21 Sinus  Rhythm  -First degree A-V block \par -Old anteroseptal infarct. \par  [de-identified] : nuclear stress test in 2018 with mild mid anterior ischemia.  \par 1/2022 abnormal EKG  [de-identified] : 10/28/21 normal LV function.  [de-identified] : coronary CT which was remarkable for CAD that was <30%

## 2022-01-21 NOTE — PHYSICAL EXAM
[Well Groomed] : well groomed [General Appearance - In No Acute Distress] : no acute distress [Normal Conjunctiva] : the conjunctiva exhibited no abnormalities [Eyelids - No Xanthelasma] : the eyelids demonstrated no xanthelasmas [Normal Oral Mucosa] : normal oral mucosa [No Oral Pallor] : no oral pallor [No Oral Cyanosis] : no oral cyanosis [Normal Jugular Venous A Waves Present] : normal jugular venous A waves present [Normal Jugular Venous V Waves Present] : normal jugular venous V waves present [No Jugular Venous Lpoez A Waves] : no jugular venous lopez A waves [Respiration, Rhythm And Depth] : normal respiratory rhythm and effort [Exaggerated Use Of Accessory Muscles For Inspiration] : no accessory muscle use [Auscultation Breath Sounds / Voice Sounds] : lungs were clear to auscultation bilaterally [Abdomen Soft] : soft [Abdomen Tenderness] : non-tender [Abdomen Mass (___ Cm)] : no abdominal mass palpated [Abnormal Walk] : normal gait [Gait - Sufficient For Exercise Testing] : the gait was sufficient for exercise testing [Nail Clubbing] : no clubbing of the fingernails [Cyanosis, Localized] : no localized cyanosis [Petechial Hemorrhages (___cm)] : no petechial hemorrhages [Skin Color & Pigmentation] : normal skin color and pigmentation [] : no rash [No Venous Stasis] : no venous stasis [Skin Lesions] : no skin lesions [No Skin Ulcers] : no skin ulcer [No Xanthoma] : no  xanthoma was observed [Oriented To Time, Place, And Person] : oriented to person, place, and time [Affect] : the affect was normal [Mood] : the mood was normal [No Anxiety] : not feeling anxious [Normal Rate] : normal [Rhythm Regular] : regular [Normal S1] : normal S1 [Normal S2] : normal S2 [I] : a grade 1 [2+] : left 2+ [No Pitting Edema] : no pitting edema present [Right Carotid Bruit] : no bruit heard over the right carotid [Left Carotid Bruit] : no bruit heard over the left carotid [Bruit] : no bruit heard

## 2022-01-21 NOTE — DISCUSSION/SUMMARY
[FreeTextEntry1] : 76 year man with a history as listed presents for a cardiac evaluation. \par \par Esvin is complaining of dyspnea on exertion and had an abnormal exercise stress test. At ths point given his known previous CAD he will get a coronary angiography to rule out obstructive disease. Resume taking ASA. He will start Toprol 50mg Qday for anginal. \par He will continue with statin therapy to achieve maintain goal LDL<100 or ideally <70. He will continue lipitor 10mg  \par \par His blood pressure is better controlled. He will continue Losartan 25mg Qday.  \par \par  At your convenience, please fax me his latest lab results including lipid profile. \par \par Exercise and diet counseling was performed in order to reduce her future cardiovascular risk. \par He will followup with me after his angiogram.

## 2022-01-30 ENCOUNTER — OUTPATIENT (OUTPATIENT)
Dept: OUTPATIENT SERVICES | Facility: HOSPITAL | Age: 77
LOS: 1 days | End: 2022-01-30
Payer: MEDICARE

## 2022-01-30 DIAGNOSIS — Z11.52 ENCOUNTER FOR SCREENING FOR COVID-19: ICD-10-CM

## 2022-01-30 LAB — SARS-COV-2 RNA SPEC QL NAA+PROBE: SIGNIFICANT CHANGE UP

## 2022-01-30 PROCEDURE — U0003: CPT

## 2022-01-30 PROCEDURE — C9803: CPT

## 2022-01-30 PROCEDURE — U0005: CPT

## 2022-02-01 ENCOUNTER — OUTPATIENT (OUTPATIENT)
Dept: OUTPATIENT SERVICES | Facility: HOSPITAL | Age: 77
LOS: 1 days | End: 2022-02-01
Payer: MEDICARE

## 2022-02-01 VITALS
SYSTOLIC BLOOD PRESSURE: 124 MMHG | RESPIRATION RATE: 18 BRPM | OXYGEN SATURATION: 98 % | HEART RATE: 58 BPM | DIASTOLIC BLOOD PRESSURE: 67 MMHG

## 2022-02-01 VITALS
SYSTOLIC BLOOD PRESSURE: 141 MMHG | DIASTOLIC BLOOD PRESSURE: 78 MMHG | TEMPERATURE: 98 F | OXYGEN SATURATION: 100 % | RESPIRATION RATE: 16 BRPM

## 2022-02-01 DIAGNOSIS — I25.10 ATHEROSCLEROTIC HEART DISEASE OF NATIVE CORONARY ARTERY WITHOUT ANGINA PECTORIS: ICD-10-CM

## 2022-02-01 LAB
ANION GAP SERPL CALC-SCNC: 9 MMOL/L — SIGNIFICANT CHANGE UP (ref 5–17)
BUN SERPL-MCNC: 26 MG/DL — HIGH (ref 7–23)
CALCIUM SERPL-MCNC: 9.3 MG/DL — SIGNIFICANT CHANGE UP (ref 8.4–10.5)
CHLORIDE SERPL-SCNC: 102 MMOL/L — SIGNIFICANT CHANGE UP (ref 96–108)
CO2 SERPL-SCNC: 25 MMOL/L — SIGNIFICANT CHANGE UP (ref 22–31)
CREAT SERPL-MCNC: 1.19 MG/DL — SIGNIFICANT CHANGE UP (ref 0.5–1.3)
GLUCOSE SERPL-MCNC: 105 MG/DL — HIGH (ref 70–99)
HCT VFR BLD CALC: 37.9 % — LOW (ref 39–50)
HGB BLD-MCNC: 11.4 G/DL — LOW (ref 13–17)
MCHC RBC-ENTMCNC: 21.9 PG — LOW (ref 27–34)
MCHC RBC-ENTMCNC: 30.1 GM/DL — LOW (ref 32–36)
MCV RBC AUTO: 72.7 FL — LOW (ref 80–100)
NRBC # BLD: 0 /100 WBCS — SIGNIFICANT CHANGE UP (ref 0–0)
PLATELET # BLD AUTO: 307 K/UL — SIGNIFICANT CHANGE UP (ref 150–400)
POTASSIUM SERPL-MCNC: 4.7 MMOL/L — SIGNIFICANT CHANGE UP (ref 3.5–5.3)
POTASSIUM SERPL-SCNC: 4.7 MMOL/L — SIGNIFICANT CHANGE UP (ref 3.5–5.3)
RBC # BLD: 5.21 M/UL — SIGNIFICANT CHANGE UP (ref 4.2–5.8)
RBC # FLD: 18.6 % — HIGH (ref 10.3–14.5)
SODIUM SERPL-SCNC: 136 MMOL/L — SIGNIFICANT CHANGE UP (ref 135–145)
WBC # BLD: 7.63 K/UL — SIGNIFICANT CHANGE UP (ref 3.8–10.5)
WBC # FLD AUTO: 7.63 K/UL — SIGNIFICANT CHANGE UP (ref 3.8–10.5)

## 2022-02-01 PROCEDURE — C1894: CPT

## 2022-02-01 PROCEDURE — C1887: CPT

## 2022-02-01 PROCEDURE — C1769: CPT

## 2022-02-01 PROCEDURE — 99152 MOD SED SAME PHYS/QHP 5/>YRS: CPT

## 2022-02-01 PROCEDURE — 93458 L HRT ARTERY/VENTRICLE ANGIO: CPT | Mod: 26

## 2022-02-01 PROCEDURE — 93458 L HRT ARTERY/VENTRICLE ANGIO: CPT

## 2022-02-01 PROCEDURE — 80048 BASIC METABOLIC PNL TOTAL CA: CPT

## 2022-02-01 PROCEDURE — 93005 ELECTROCARDIOGRAM TRACING: CPT

## 2022-02-01 PROCEDURE — 93010 ELECTROCARDIOGRAM REPORT: CPT

## 2022-02-01 PROCEDURE — 85027 COMPLETE CBC AUTOMATED: CPT

## 2022-02-01 RX ORDER — CHOLECALCIFEROL (VITAMIN D3) 125 MCG
1 CAPSULE ORAL
Qty: 0 | Refills: 0 | DISCHARGE

## 2022-02-01 RX ORDER — RABEPRAZOLE 20 MG/1
20 TABLET, DELAYED RELEASE ORAL
Qty: 0 | Refills: 0 | DISCHARGE

## 2022-02-01 RX ORDER — TAMSULOSIN HYDROCHLORIDE 0.4 MG/1
1 CAPSULE ORAL
Qty: 0 | Refills: 0 | DISCHARGE

## 2022-02-01 RX ORDER — LOSARTAN POTASSIUM 100 MG/1
1 TABLET, FILM COATED ORAL
Qty: 0 | Refills: 0 | DISCHARGE

## 2022-02-01 RX ORDER — DULOXETINE HYDROCHLORIDE 30 MG/1
1 CAPSULE, DELAYED RELEASE ORAL
Qty: 0 | Refills: 0 | DISCHARGE

## 2022-02-01 RX ORDER — METOPROLOL TARTRATE 50 MG
1 TABLET ORAL
Qty: 0 | Refills: 0 | DISCHARGE

## 2022-02-01 RX ORDER — ASPIRIN/CALCIUM CARB/MAGNESIUM 324 MG
1 TABLET ORAL
Qty: 0 | Refills: 0 | DISCHARGE

## 2022-02-01 RX ORDER — UBIDECARENONE 100 MG
1 CAPSULE ORAL
Qty: 0 | Refills: 0 | DISCHARGE

## 2022-02-01 RX ORDER — ATORVASTATIN CALCIUM 80 MG/1
1 TABLET, FILM COATED ORAL
Qty: 0 | Refills: 0 | DISCHARGE

## 2022-02-01 NOTE — ASU DISCHARGE PLAN (ADULT/PEDIATRIC) - CARE PROVIDER_API CALL
Hermila Brown)  Internal Medicine  [address] [place] [address]  Phone: [phone]  Fax: [phone]  Follow Up Time: 2 weeks

## 2022-02-01 NOTE — ASU DISCHARGE PLAN (ADULT/PEDIATRIC) - PHYSICIAN SECTION COMPLETE
Behavioral Health Transition Record to Provider    Patient Name: Lexi Pro  YOB: 1992  Medical Record Number: [id number]  Date of Admission: 2020  Date of Discharge: 2020    Attending Provider: Paul Bennett MD  Discharging Provider: Cheli Palmer   To contact this individual call [phone] and ask the  to page. If unavailable, ask to be transferred to [address] [place] [address] Provider on call. [facility] Provider will be available on call  and during holidays. Primary Care Provider: None    No Known Allergies    Reason for Admission: :  \"I have been feeling really depressed. \"     HISTORY OF PRESENT ILLNESS:  The patient is a 61-year-old female who is currently admitted at [address] on a voluntary basis.  She stated she has suffered from depression back when she was in high school. Bennett Mary was put on Celexa, Adderall and risperidone.  She shared that she was admitted at [facility] then twice after she overdosed on both occasions.  She spoke to her OB/GYN a few days ago, and she was prescribed Celexa, but she never had a chance to pick it up since her OB recommended for her to be evaluated in the ER.  The patient states that her depression back in high school had resolved, but then she started feeling depressed after the birth of her 11month-old son. Bennett Mary shares that she had a traumatic childbirth.  She had to go into emergency . Bennett Mary also had to go back to the hospital due to infection in her incision site. Bennett Mary states she has been overwhelmed with psychosocial stressors, trying to become a mother, trying to get a job and trying to support her son. [place] motivation has been poor, and her appetite has increased.  She states that she was feeling hopeless and helpless to the point that she had developed passive thoughts of suicide. Bennett Mary shares that she would not care if she was not here anymore.  Her urine drug screen is positive for THC.  She states she smokes THC on weekends. Romero Villafana is supposed to have an appointment with [facility] in 6 months.  She currently denies suicidal ideation, homicidal ideation, auditory or visual hallucination. Admission Diagnosis: Depression complicating pregnancy, postpartum [O99.345, F53.0]    * No surgery found *    Results for orders placed or performed during the hospital encounter of 08/04/20   URINE CULTURE HOLD SAMPLE    Specimen: Serum; Urine   Result Value Ref Range    Urine culture hold        Urine on hold in Microbiology dept for 2 days. If unpreserved urine is submitted, it cannot be used for addtional testing after 24 hours, recollection will be required.    URINALYSIS W/MICROSCOPIC   Result Value Ref Range    Color YELLOW/STRAW      Appearance CLEAR CLEAR      Specific gravity 1.017 1.003 - 1.030      pH (UA) 5.0 5.0 - 8.0      Protein Negative NEG mg/dL    Glucose Negative NEG mg/dL    Ketone Negative NEG mg/dL    Bilirubin Negative NEG      Blood Negative NEG      Urobilinogen 0.2 0.2 - 1.0 EU/dL    Nitrites Negative NEG      Leukocyte Esterase Negative NEG      WBC 0-4 0 - 4 /hpf    RBC 0-5 0 - 5 /hpf    Epithelial cells FEW FEW /lpf    Bacteria Negative NEG /hpf    Hyaline cast 0-2 0 - 5 /lpf   DRUG SCREEN, URINE   Result Value Ref Range    AMPHETAMINES Negative NEG      BARBITURATES Negative NEG      BENZODIAZEPINES Negative NEG      COCAINE Negative NEG      METHADONE Negative NEG      OPIATES Negative NEG      PCP(PHENCYCLIDINE) Negative NEG      THC (TH-CANNABINOL) Positive (A) NEG      Drug screen comment (NOTE)    SALICYLATE   Result Value Ref Range    Salicylate level <3.6 (L) 2.8 - 20.0 MG/DL   ACETAMINOPHEN   Result Value Ref Range    Acetaminophen level <2 (L) 10 - 30 ug/mL   ETHYL ALCOHOL   Result Value Ref Range    ALCOHOL(ETHYL),SERUM <10 <51 MG/DL   METABOLIC PANEL, COMPREHENSIVE   Result Value Ref Range    Sodium 140 136 - 145 mmol/L    Potassium 3.6 3.5 - 5.1 mmol/L    Chloride 108 97 - 108 mmol/L    CO2 22 21 - 32 mmol/L    Anion gap 10 5 - 15 mmol/L    Glucose 147 (H) 65 - 100 mg/dL    BUN 9 6 - 20 MG/DL    Creatinine 0.79 0.55 - 1.02 MG/DL    BUN/Creatinine ratio 11 (L) 12 - 20      GFR est AA >60 >60 ml/min/1.73m2    GFR est non-AA >60 >60 ml/min/1.73m2    Calcium 9.0 8.5 - 10.1 MG/DL    Bilirubin, total 0.5 0.2 - 1.0 MG/DL    ALT (SGPT) 22 12 - 78 U/L    AST (SGOT) 16 15 - 37 U/L    Alk. phosphatase 84 45 - 117 U/L    Protein, total 7.9 6.4 - 8.2 g/dL    Albumin 3.5 3.5 - 5.0 g/dL    Globulin 4.4 (H) 2.0 - 4.0 g/dL    A-G Ratio 0.8 (L) 1.1 - 2.2     CBC WITH AUTOMATED DIFF   Result Value Ref Range    WBC 8.4 3.6 - 11.0 K/uL    RBC 5.18 3.80 - 5.20 M/uL    HGB 11.0 (L) 11.5 - 16.0 g/dL    HCT 36.9 35.0 - 47.0 %    MCV 71.2 (L) 80.0 - 99.0 FL    MCH 21.2 (L) 26.0 - 34.0 PG    MCHC 29.8 (L) 30.0 - 36.5 g/dL    RDW 17.7 (H) 11.5 - 14.5 %    PLATELET 501 165 - 986 K/uL    NRBC 0.0 0  WBC    ABSOLUTE NRBC 0.00 0.00 - 0.01 K/uL    NEUTROPHILS 57 32 - 75 %    LYMPHOCYTES 35 12 - 49 %    MONOCYTES 5 5 - 13 %    EOSINOPHILS 2 0 - 7 %    BASOPHILS 1 0 - 1 %    IMMATURE GRANULOCYTES 0 0.0 - 0.5 %    ABS. NEUTROPHILS 4.8 1.8 - 8.0 K/UL    ABS. LYMPHOCYTES 2.9 0.8 - 3.5 K/UL    ABS. MONOCYTES 0.4 0.0 - 1.0 K/UL    ABS. EOSINOPHILS 0.2 0.0 - 0.4 K/UL    ABS. BASOPHILS 0.0 0.0 - 0.1 K/UL    ABS. IMM.  GRANS. 0.0 0.00 - 0.04 K/UL    DF AUTOMATED     SARS-COV-2   Result Value Ref Range    Specimen source Nasopharyngeal      SARS-CoV-2 Not detected NOTD      Specimen source Nasopharyngeal     TSH 3RD GENERATION   Result Value Ref Range    TSH 0.54 0.36 - 3.74 uIU/mL   LIPID PANEL   Result Value Ref Range    LIPID PROFILE          Cholesterol, total 169 <200 MG/DL    Triglyceride 103 <150 MG/DL    HDL Cholesterol 47 MG/DL    LDL, calculated 101.4 (H) 0 - 100 MG/DL    VLDL, calculated 20.6 MG/DL    CHOL/HDL Ratio 3.6 0.0 - 5.0     HCG URINE, QL. - POC   Result Value Ref Range Pregnancy test,urine (POC) Negative NEG         Immunizations administered during this encounter: There is no immunization history on file for this patient. Screening for Metabolic Disorders for Patients on Antipsychotic Medications  (Data obtained from the EMR)    Estimated Body Mass Index  Estimated body mass index is 40.42 kg/m² as calculated from the following:    Height as of this encounter: 6' (1.829 m). Weight as of this encounter: 135.2 kg (298 lb). Vital Signs/Blood Pressure  Visit Vitals  /79   Pulse 80   Temp 98.2 °F (36.8 °C)   Resp 16   Ht 6' (1.829 m)   Wt 135.2 kg (298 lb)   SpO2 100%   BMI 40.42 kg/m²       Blood Glucose/Hemoglobin A1c  Lab Results   Component Value Date/Time    Glucose 147 (H) 08/04/2020 09:47 PM       No results found for: HBA1C, HGBE8, LAJ8JVBA     Lipid Panel  Lab Results   Component Value Date/Time    Cholesterol, total 169 08/06/2020 06:17 AM    HDL Cholesterol 47 08/06/2020 06:17 AM    LDL, calculated 101.4 (H) 08/06/2020 06:17 AM    Triglyceride 103 08/06/2020 06:17 AM    CHOL/HDL Ratio 3.6 08/06/2020 06:17 AM        Discharge Diagnosis: Major depressive disorder, recurrent, moderate, without psychotic features.  Cannabis use disorder, unspecified.       Discharge Plan: She will be discharged home with her son. The patient Linh Brooks exhibits the ability to control behavior in a less restrictive environment. Patient's level of functioning is improving. No assaultive/destructive behavior has been observed for the past 24 hours. No suicidal/homicidal threat or behavior has been observed for the past 24 hours. There is no evidence of serious medication side effects. Patient has not been in physical or protective restraints for at least the past 24 hours. If weapons involved, how are they secured? No     Is patient aware of and in agreement with discharge plan? Yes    Arrangements for medication:  Prescriptions given to patient.      Copy of discharge instructions to provider?:  [address] for transportation home:  Family to . Keep all follow up appointments as scheduled, continue to take prescribed medications per physician instructions. Mental health crisis number:  [phone] or your local mental health crisis line number at [phone]          Discharge Medication List and Instructions:   Current Discharge Medication List      START taking these medications    Details   buPROPion XL (Wellbutrin XL) 150 mg tablet Take 1 Tab by mouth daily. Indications: major depressive disorder  Qty: 30 Tab, Refills: 0             Unresulted Labs (24h ago, onward)    None        To obtain results of studies pending at discharge, please contact [phone]    Follow-up Information     Follow up With Specialties Details Why [address]  Go on 8/27/2020 10am with w/ Marcella Li via the phone. Ask your  to be connected to a therapist.  [facility], [address] [place] [address]  [phone]    Mariana Reddy NP   Go on 8/31/2020 Virtual appointment with NP at 10:20am.  Maternal Fetal Medicine  Obstetrics and Gynecology   [facility]  [address]   [address], [address] [facility]       None    None ([phone]) Patient stated that they have no PCP            Advanced Directive:   Does the patient have an appointed surrogate decision maker? No  Does the patient have a Medical Advance Directive? No  Does the patient have a Psychiatric Advance Directive? No  If the patient does not have a surrogate or Medical Advance Directive AND Psychiatric Advance Directive, the patient was offered information on these advance directives Patient declined to complete    Patient Instructions: Please continue all medications until otherwise directed by physician. Tobacco Cessation Discharge Plan:   Is the patient a smoker and needs referral for smoking cessation?  Yes  Patient referred to the following for smoking cessation with an appointment? Refused     Patient was offered medication to assist with smoking cessation at discharge? Refused  Was education for smoking cessation added to the discharge instructions? Yes    Alcohol/Substance Abuse Discharge Plan:   Does the patient have a history of substance/alcohol abuse and requires a referral for treatment? Yes  Patient referred to the following for substance/alcohol abuse treatment with an appointment? Yes- referred to [facility]   Patient was offered medication to assist with alcohol cessation at discharge? No  Was education for substance/alcohol abuse added to discharge instructions? No    Patient discharged to Home; discussed with patient/caregiver and provided to the patient/caregiver either in hard copy or electronically. Yes

## 2022-02-01 NOTE — ASU DISCHARGE PLAN (ADULT/PEDIATRIC) - NS MD DC FALL RISK RISK
For information on Fall & Injury Prevention, visit: [email or web address] OR  [email or web address] OR  https://www.cdc.gov/steadi/patient.html

## 2022-02-01 NOTE — H&P CARDIOLOGY - HISTORY OF PRESENT ILLNESS
76 year old  male with no implantable devices h/o HTN, non obstructive CAD, PMR on steroids, GERD, who c/o stable dyspnea on exertion: with one flight of staris (class II angina). 1/20/22 Exercise Stress Test Revealed: 6 METS, 1mm of downsloping ST segment depression noted in the inferior leads at peak exercise which returned to baseline in recovery, nonsustained frequent VPC's were noted at peak exercise. Pt presents for left heart cath today. 10/28/21 TTE: no WMA, diastolic LV dysfxn, EF 62%.  76 year old  male with no implantable devices h/o HTN, non obstructive CAD, PMR on steroids, GERD, who c/o stable dyspnea on exertion: with one flight of stairs (class II angina). 1/20/22 Exercise Stress Test Revealed: 6 METS, 1mm of downsloping ST segment depression noted in the inferior leads at peak exercise which returned to baseline in recovery, nonsustained frequent VPC's were noted at peak exercise. Pt presents for left heart cath today. 10/28/21 TTE: no WMA, diastolic LV dysfxn, EF 62%.

## 2022-02-01 NOTE — H&P CARDIOLOGY - NSICDXPASTMEDICALHX_GEN_ALL_CORE_FT
PAST MEDICAL HISTORY:  Arthritis     CAD (coronary artery disease)     FH: HTN (hypertension)     GERD (gastroesophageal reflux disease)     Hiatal hernia     PMR (polymyalgia rheumatica) on steroids

## 2022-02-01 NOTE — ASU PATIENT PROFILE, ADULT - FALL HARM RISK - UNIVERSAL INTERVENTIONS
Bed in lowest position, wheels locked, appropriate side rails in place/Call bell, personal items and telephone in reach/Instruct patient to call for assistance before getting out of bed or chair/Non-slip footwear when patient is out of bed/[place] to call system/Physically safe environment - no spills, clutter or unnecessary equipment/Purposeful Proactive Rounding/Room/bathroom lighting operational, light cord in reach

## 2022-02-02 PROBLEM — K44.9 DIAPHRAGMATIC HERNIA WITHOUT OBSTRUCTION OR GANGRENE: Chronic | Status: ACTIVE | Noted: 2022-02-01

## 2022-02-02 PROBLEM — Z82.49 FAMILY HISTORY OF ISCHEMIC HEART DISEASE AND OTHER DISEASES OF THE CIRCULATORY SYSTEM: Chronic | Status: ACTIVE | Noted: 2022-02-01

## 2022-02-02 PROBLEM — M35.3 POLYMYALGIA RHEUMATICA: Chronic | Status: ACTIVE | Noted: 2022-02-01

## 2022-02-02 PROBLEM — I25.10 ATHEROSCLEROTIC HEART DISEASE OF NATIVE CORONARY ARTERY WITHOUT ANGINA PECTORIS: Chronic | Status: ACTIVE | Noted: 2022-02-01

## 2022-02-04 ENCOUNTER — NON-APPOINTMENT (OUTPATIENT)
Age: 77
End: 2022-02-04

## 2022-02-04 ENCOUNTER — APPOINTMENT (OUTPATIENT)
Dept: CARDIOLOGY | Facility: CLINIC | Age: 77
End: 2022-02-04
Payer: MEDICARE

## 2022-02-04 VITALS
BODY MASS INDEX: 31.28 KG/M2 | HEIGHT: 73 IN | HEART RATE: 85 BPM | SYSTOLIC BLOOD PRESSURE: 123 MMHG | DIASTOLIC BLOOD PRESSURE: 68 MMHG | WEIGHT: 236 LBS

## 2022-02-04 PROCEDURE — 99214 OFFICE O/P EST MOD 30 MIN: CPT

## 2022-02-04 PROCEDURE — 93000 ELECTROCARDIOGRAM COMPLETE: CPT

## 2022-02-04 NOTE — HISTORY OF PRESENT ILLNESS
[FreeTextEntry1] : 76 year old man with a history of PMR on steroids, knee arthritis, GERD, aspiration, hiatal hernia, double vision, HTN.\par \par Since his last visit, he had an normal coronary angiogram.  He is getting MANN when walking up the stairs. He   denies any chest pain, PND, orthopnea,  near syncope, syncope, strokelike symptoms, lower extremity edema, palpitations. He denies any blurry vision, headaches. He is compliant with his medications. \par  \par

## 2022-02-04 NOTE — DISCUSSION/SUMMARY
[FreeTextEntry1] : 76 year man with a history as listed presents for a cardiac evaluation. \par \par Esvin is doing well. His angiogram was unremarkable. He still have MANN which is likely related to conditioning. His EKG did not reveal any significant ischemic changes. His blood pressure is better controlled. He will continue Toprol 50mg Qday and Losartan 25mg Qday.  \par He will continue with statin therapy to achieve maintain goal LDL<130 or ideally <70. He will continue lipitor 10mg  \par  At your convenience, please fax me his latest lab results including lipid profile. \par Exercise and diet counseling was performed in order to reduce her future cardiovascular risk. \par He will followup with me in 6 months

## 2022-02-04 NOTE — CARDIOLOGY SUMMARY
[de-identified] : 2/4/22 Sinus  Rhythm  -First degree A-V block \par -Old anteroseptal infarct. \par  [de-identified] : nuclear stress test in 2018 with mild mid anterior ischemia.  \par 1/2022 abnormal EKG  [de-identified] : 10/28/21 normal LV function.  [de-identified] : coronary CT which was remarkable for CAD that was <30% [de-identified] : 1/2022 normal cors

## 2022-04-25 ENCOUNTER — NON-APPOINTMENT (OUTPATIENT)
Age: 77
End: 2022-04-25

## 2022-04-25 ENCOUNTER — APPOINTMENT (OUTPATIENT)
Dept: CARDIOLOGY | Facility: CLINIC | Age: 77
End: 2022-04-25
Payer: MEDICARE

## 2022-04-25 VITALS
OXYGEN SATURATION: 96 % | HEIGHT: 73 IN | SYSTOLIC BLOOD PRESSURE: 138 MMHG | DIASTOLIC BLOOD PRESSURE: 73 MMHG | WEIGHT: 233 LBS | BODY MASS INDEX: 30.88 KG/M2 | HEART RATE: 64 BPM

## 2022-04-25 VITALS — DIASTOLIC BLOOD PRESSURE: 70 MMHG | SYSTOLIC BLOOD PRESSURE: 110 MMHG

## 2022-04-25 PROCEDURE — 99214 OFFICE O/P EST MOD 30 MIN: CPT

## 2022-04-25 PROCEDURE — 93000 ELECTROCARDIOGRAM COMPLETE: CPT

## 2022-04-25 NOTE — DISCUSSION/SUMMARY
[FreeTextEntry1] : 76 year man with a history as listed presents for a cardiac evaluation. \par \par Esvin is doing well. His angiogram was unremarkable. He still have MANN which is likely related to conditioning. His EKG did not reveal any significant ischemic changes. \par \par His blood pressure is better controlled. He will continue Toprol 50mg Qday and Losartan 25mg Qday.  \par \par he has orthostatic symptoms at times from poor hydration. he will increase his fluid intake. \par  \par He will continue with statin therapy to achieve maintain goal LDL<130 or ideally <70. He will continue lipitor 10mg  \par \par  At your convenience, please fax me his latest lab results including lipid profile. \par Exercise and diet counseling was performed in order to reduce her future cardiovascular risk. \par He will followup with me in 6 months

## 2022-04-25 NOTE — CARDIOLOGY SUMMARY
[de-identified] : nuclear stress test in 2018 with mild mid anterior ischemia.  \par 1/2022 abnormal EKG  [de-identified] : 4/25/22 Sinus  Rhythm  -First degree A-V block \par -Old anteroseptal infarct. \par  [de-identified] : 10/28/21 normal LV function.  [de-identified] : coronary CT which was remarkable for CAD that was <30% [de-identified] : 1/2022 normal cors

## 2022-04-25 NOTE — HISTORY OF PRESENT ILLNESS
[FreeTextEntry1] : 76 year old man with a history of PMR on steroids, knee arthritis, GERD, aspiration, hiatal hernia, double vision, HTN.\par \par Since his last visit, he has been complaining of lightheadedness when bending at the waist. He denies any LOC.  He denies any dyspnea. \par He is taking the dog for a walk a few times a days.  He is getting MANN when walking up the stairs. He   denies any chest pain, PND, orthopnea,  strokelike symptoms, lower extremity edema, palpitations. He denies any blurry vision, headaches.\par  He is compliant with his medications. \par  \par

## 2022-09-06 ENCOUNTER — NON-APPOINTMENT (OUTPATIENT)
Age: 77
End: 2022-09-06

## 2022-09-06 ENCOUNTER — APPOINTMENT (OUTPATIENT)
Dept: CARDIOLOGY | Facility: CLINIC | Age: 77
End: 2022-09-06

## 2022-09-06 VITALS
HEIGHT: 73 IN | WEIGHT: 220 LBS | BODY MASS INDEX: 29.16 KG/M2 | SYSTOLIC BLOOD PRESSURE: 133 MMHG | HEART RATE: 54 BPM | OXYGEN SATURATION: 99 % | DIASTOLIC BLOOD PRESSURE: 74 MMHG

## 2022-09-06 PROCEDURE — 99214 OFFICE O/P EST MOD 30 MIN: CPT

## 2022-09-06 PROCEDURE — 93000 ELECTROCARDIOGRAM COMPLETE: CPT

## 2022-09-06 NOTE — DISCUSSION/SUMMARY
[FreeTextEntry1] : 76 year man with a history as listed presents for a cardiac evaluation. \par \par Esvin is doing well. His angiogram was unremarkable. His  MANN which is likely related to conditioning. His EKG did not reveal any significant ischemic changes. \par \par His blood pressure is better controlled. He will continue Toprol 50mg Qday and Losartan 25mg Qday.  \par \par he has orthostatic symptoms at times from poor hydration. he will increase his fluid intake. \par  \par He will continue with statin therapy to achieve maintain goal LDL<130 or ideally <70. He will continue lipitor 10mg  \par \par  At your convenience, please fax me his latest lab results including lipid profile. \par Exercise and diet counseling was performed in order to reduce her future cardiovascular risk. \par He will followup with me in 6 months  [EKG obtained to assist in diagnosis and management of assessed problem(s)] : EKG obtained to assist in diagnosis and management of assessed problem(s)

## 2022-09-06 NOTE — HISTORY OF PRESENT ILLNESS
[FreeTextEntry1] : 77 year old man with a history of PMR on steroids, knee arthritis, GERD, aspiration, hiatal hernia, double vision, HTN.\par \par Since his last visit, he is feeling well. He recently went [place] and [place] . He has been walking active.  He is getting MANN when walking up the stairs which has not changed. He   denies any chest pain, PND, orthopnea,  strokelike symptoms, lower extremity edema, palpitations. He denies any blurry vision, headaches.\par  He is compliant with his medications. \par  \par

## 2022-09-06 NOTE — CARDIOLOGY SUMMARY
[de-identified] : 9/6/22 Sinus  Bradycardia  -First degree A-V block \par Low voltage in limb leads. \par poor R wave progression\par  [de-identified] : nuclear stress test in 2018 with mild mid anterior ischemia.  \par 1/2022 abnormal EKG  [de-identified] : 10/28/21 normal LV function.  [de-identified] : coronary CT which was remarkable for CAD that was <30% [de-identified] : 1/2022 normal cors

## 2022-12-01 ENCOUNTER — RX RENEWAL (OUTPATIENT)
Age: 77
End: 2022-12-01

## 2022-12-09 ENCOUNTER — RX RENEWAL (OUTPATIENT)
Age: 77
End: 2022-12-09

## 2023-01-25 ENCOUNTER — RX RENEWAL (OUTPATIENT)
Age: 78
End: 2023-01-25

## 2023-02-27 ENCOUNTER — APPOINTMENT (OUTPATIENT)
Dept: CARDIOLOGY | Facility: CLINIC | Age: 78
End: 2023-02-27
Payer: MEDICARE

## 2023-02-27 ENCOUNTER — NON-APPOINTMENT (OUTPATIENT)
Age: 78
End: 2023-02-27

## 2023-02-27 VITALS
WEIGHT: 239 LBS | BODY MASS INDEX: 31.68 KG/M2 | HEIGHT: 73 IN | OXYGEN SATURATION: 94 % | SYSTOLIC BLOOD PRESSURE: 145 MMHG | DIASTOLIC BLOOD PRESSURE: 74 MMHG | HEART RATE: 67 BPM

## 2023-02-27 DIAGNOSIS — R06.09 OTHER FORMS OF DYSPNEA: ICD-10-CM

## 2023-02-27 PROCEDURE — 93000 ELECTROCARDIOGRAM COMPLETE: CPT

## 2023-02-27 PROCEDURE — 99214 OFFICE O/P EST MOD 30 MIN: CPT

## 2023-02-27 NOTE — HISTORY OF PRESENT ILLNESS
[FreeTextEntry1] : 77 year old man with a history of PMR on steroids, knee arthritis, GERD, aspiration, hiatal hernia, double vision, HTN.\par \par Since his last visit, he  been complaining of more dyspnea on exertion especially when going up the stairs but now happening with walking.  He has gained about 20 lbs.  denies any chest pain, PND, orthopnea,  strokelike symptoms, lower extremity edema, palpitations. He denies any blurry vision, headaches.\par  He is compliant with his medications. \par  \par

## 2023-02-27 NOTE — CARDIOLOGY SUMMARY
[de-identified] : Sinus  Rhythm  -First degree A-V block  [de-identified] : nuclear stress test in 2018 with mild mid anterior ischemia.  \par 1/2022 abnormal EKG  [de-identified] : 10/28/21 normal LV function.  [de-identified] : coronary CT which was remarkable for CAD that was <30% [de-identified] : 1/2022 normal cors

## 2023-02-27 NOTE — DISCUSSION/SUMMARY
[FreeTextEntry1] : 77 year man with a history as listed presents for a cardiac evaluation. \par \par Esvin is complaining of worsening MANN which is likely from deconditioning.  His angiogram was unremarkable. He is euvolemic on exam.  His EKG did not reveal any significant ischemic changes. He needs a full pulmonary assessment. \par \par His blood pressure is uncontrolled. He will continue Toprol 50mg Qday. Increase the Losartan to 50mg Qday.  \par \par he has orthostatic symptoms at times from poor hydration. he will increase his fluid intake. \par  \par He will continue with statin therapy to achieve maintain goal LDL<130 or ideally <70. He will continue lipitor 10mg  \par \par  At your convenience, please fax me his latest lab results including lipid profile. \par Exercise and diet counseling was performed in order to reduce her future cardiovascular risk. \par He will followup with me in 6 months  [EKG obtained to assist in diagnosis and management of assessed problem(s)] : EKG obtained to assist in diagnosis and management of assessed problem(s)

## 2023-03-13 NOTE — REVIEW OF SYSTEMS
Edmar's INR today is subtherapeutic at 1.4 on 0 mg of Warfarin in the last 3 days; 37.5mg in 7 days with 3 misses  Reason for anticoagulation: atrial fibrillation  INR Range: 2.0-3.0  Last INR: 1.9 on 3/10/23    Brigida with [facility] called regarding above INR results. She states that nobody can go out any more this week due to staffing. Advised of dosing instructions and plan for next INR. Advised [facility] will need to notify them next week what day to get INR after Mondays results.    Patient reports no abnormal findings related to being on Warfarin. Reports no medication or dietary changes. Verified correct previous Warfarin dosing. Patient was advised to take the following Warfarin dosing: Take 10mg of Warfarin Monday/Friday; 7.5mg all other days totaling 57.5mg weekly. Date of next INR: Monday 3/20/23 before or after appt with Dr Cain. Timed staff message created.  No further questions/concerns.  Onsite billing provider is ELLA Dumont.    Advised to contact the clinic at [phone] with the following:   Medication changes: New, dose change, advised to stop, especially antibiotics/steroids  Procedures planned: Surgical/dental/injections, or if you are advised to hold your Warfarin  Bruising/bleeding  ER visits/Urgent Care visits       [Dyspnea on exertion] : dyspnea during exertion [Joint Pain] : joint pain [Joint Stiffness] : joint stiffness [Negative] : Heme/Lymph

## 2023-08-29 ENCOUNTER — APPOINTMENT (OUTPATIENT)
Dept: CARDIOLOGY | Facility: CLINIC | Age: 78
End: 2023-08-29
Payer: MEDICARE

## 2023-08-29 VITALS
HEIGHT: 73 IN | BODY MASS INDEX: 31.28 KG/M2 | HEART RATE: 70 BPM | SYSTOLIC BLOOD PRESSURE: 128 MMHG | DIASTOLIC BLOOD PRESSURE: 67 MMHG | OXYGEN SATURATION: 96 % | WEIGHT: 236 LBS

## 2023-08-29 DIAGNOSIS — R94.31 ABNORMAL ELECTROCARDIOGRAM [ECG] [EKG]: ICD-10-CM

## 2023-08-29 PROCEDURE — 99214 OFFICE O/P EST MOD 30 MIN: CPT

## 2023-08-29 PROCEDURE — 93000 ELECTROCARDIOGRAM COMPLETE: CPT

## 2023-08-29 NOTE — HISTORY OF PRESENT ILLNESS
[FreeTextEntry1] : 78-year-old man with a history of PMR on steroids, knee arthritis, GERD, aspiration, hiatal hernia, double vision, HTN present for a followup visit.   Since his last visit, he has noted worsening MANN which is happening with less effort. He denies any chest pain, PND, orthopnea,  strokelike symptoms, lower extremity edema, palpitations. He denies any blurry vision, headaches. He recently had a CT chest which we do not have the results for. Medication reconciliation performed.  He is compliant with his medications.

## 2023-08-29 NOTE — DISCUSSION/SUMMARY
[FreeTextEntry1] : 78 year man with a history as listed presents for a cardiac evaluation.   Esvin is complaining of worsening MANN which is likely from deconditioning.  His angiogram in 2022 was unremarkable. He is euvolemic on exam.  His EKG did not reveal any significant ischemic changes. He needs a full pulmonary assessment. He will see pulmonary this week.   His blood pressure is controlled. He will continue Toprol 50mg Qday and Losartan 50mg Qday.    he has orthostatic symptoms at times from poor hydration. he will increase his fluid intake.    He will continue with statin therapy to achieve maintain goal LDL<130 or ideally <70. He will continue lipitor 10mg     At your convenience, please fax me his latest lab results including lipid profile.  Exercise and diet counseling was performed in order to reduce her future cardiovascular risk.  He will followup with me in 6 months  [EKG obtained to assist in diagnosis and management of assessed problem(s)] : EKG obtained to assist in diagnosis and management of assessed problem(s)

## 2023-08-29 NOTE — CARDIOLOGY SUMMARY
[de-identified] : Sinus Rhythm -First degree A-V block  Low voltage with rightward P-axis    [de-identified] : nuclear stress test in 2018 with mild mid anterior ischemia.  \par  1/2022 abnormal EKG  [de-identified] : 10/28/21 normal LV function.  [de-identified] : coronary CT which was remarkable for CAD that was <30% [de-identified] : 1/2022 normal cors

## 2023-10-19 ENCOUNTER — RX RENEWAL (OUTPATIENT)
Age: 78
End: 2023-10-19

## 2023-10-23 ENCOUNTER — RX RENEWAL (OUTPATIENT)
Age: 78
End: 2023-10-23

## 2024-01-21 RX ORDER — ATORVASTATIN CALCIUM 10 MG/1
10 TABLET, FILM COATED ORAL
Qty: 90 | Refills: 1 | Status: ACTIVE | COMMUNITY
Start: 2021-06-18

## 2024-02-29 ENCOUNTER — APPOINTMENT (OUTPATIENT)
Dept: CARDIOLOGY | Facility: CLINIC | Age: 79
End: 2024-02-29
Payer: MEDICARE

## 2024-02-29 VITALS
OXYGEN SATURATION: 100 % | HEART RATE: 54 BPM | SYSTOLIC BLOOD PRESSURE: 137 MMHG | HEIGHT: 73 IN | DIASTOLIC BLOOD PRESSURE: 71 MMHG

## 2024-02-29 DIAGNOSIS — D50.8 OTHER IRON DEFICIENCY ANEMIAS: ICD-10-CM

## 2024-02-29 PROCEDURE — G2211 COMPLEX E/M VISIT ADD ON: CPT

## 2024-02-29 PROCEDURE — 99214 OFFICE O/P EST MOD 30 MIN: CPT

## 2024-02-29 PROCEDURE — 93000 ELECTROCARDIOGRAM COMPLETE: CPT

## 2024-02-29 RX ORDER — METOPROLOL SUCCINATE 50 MG/1
50 TABLET, EXTENDED RELEASE ORAL DAILY
Qty: 90 | Refills: 2 | Status: DISCONTINUED | COMMUNITY
Start: 2022-01-21 | End: 2024-02-29

## 2024-02-29 NOTE — HISTORY OF PRESENT ILLNESS
[FreeTextEntry1] : 78-year-old man with a history of PMR on steroids, knee arthritis, GERD, aspiration, hiatal hernia, double vision, HTN present for a followup visit.   Since his last visit, he has noted worsening MANN which is unchanged. He was found to have a Hb 8. No reported melena, hematochezia or hematemesis.  He denies any chest pain, PND, orthopnea,  strokelike symptoms, lower extremity edema, palpitations. He denies any blurry vision, headaches. He has mild lightheadedness   upon standing. He is pending a  EGD. Medication reconciliation performed.  He is compliant with his medications.

## 2024-02-29 NOTE — DISCUSSION/SUMMARY
[FreeTextEntry1] : 78 year man with a history as listed presents for a cardiac evaluation.   Esvin is complaining of worsening MANN which is likely from his worsening anemia.  His angiogram in 2022 was unremarkable. He is euvolemic on exam.  His EKG did not reveal any significant ischemic changes.  He should see heme. He will continue iron supplementation.   His blood pressure is controlled but he is bradycardic. This is likely not helpful in veronica setting of worsening anemia. He will hold the Toprol.  He will continue  Losartan 50mg Qday.     he will increase his fluid intake.    He will continue with statin therapy to achieve maintain goal LDL<130 or ideally <70. He will continue lipitor 10mg    He currently has no active cardiac conditions. He may proceed with the planned low risk endoscopic procedure  without any further cardiac workup. Routine hemodynamic monitoring is suggested during the procedure.   At your convenience, please fax me his latest lab results including lipid profile.  Exercise and diet counseling was performed in order to reduce her future cardiovascular risk.  He will followup with me in 4-6 months  [EKG obtained to assist in diagnosis and management of assessed problem(s)] : EKG obtained to assist in diagnosis and management of assessed problem(s)

## 2024-02-29 NOTE — CARDIOLOGY SUMMARY
[de-identified] : Sinus Bradycardia -First degree A-V block  Low voltage in limb leads. -Old anteroseptal infarct. -Nonspecific T-abnormality. [de-identified] : nuclear stress test in 2018 with mild mid anterior ischemia.  \par  1/2022 abnormal EKG  [de-identified] : 10/28/21 normal LV function.  [de-identified] : coronary CT which was remarkable for CAD that was <30% [de-identified] : 1/2022 normal cors

## 2024-03-06 ENCOUNTER — NON-APPOINTMENT (OUTPATIENT)
Age: 79
End: 2024-03-06

## 2024-05-10 RX ORDER — LOSARTAN POTASSIUM 50 MG/1
50 TABLET, FILM COATED ORAL
Qty: 90 | Refills: 1 | Status: ACTIVE | COMMUNITY
Start: 2019-05-06

## 2024-06-24 ENCOUNTER — APPOINTMENT (OUTPATIENT)
Dept: CARDIOLOGY | Facility: CLINIC | Age: 79
End: 2024-06-24
Payer: MEDICARE

## 2024-06-24 ENCOUNTER — NON-APPOINTMENT (OUTPATIENT)
Age: 79
End: 2024-06-24

## 2024-06-24 VITALS
HEIGHT: 73 IN | WEIGHT: 226 LBS | OXYGEN SATURATION: 94 % | DIASTOLIC BLOOD PRESSURE: 82 MMHG | HEART RATE: 65 BPM | BODY MASS INDEX: 29.95 KG/M2 | SYSTOLIC BLOOD PRESSURE: 150 MMHG

## 2024-06-24 VITALS — SYSTOLIC BLOOD PRESSURE: 128 MMHG | DIASTOLIC BLOOD PRESSURE: 78 MMHG

## 2024-06-24 DIAGNOSIS — I25.10 ATHEROSCLEROTIC HEART DISEASE OF NATIVE CORONARY ARTERY W/OUT ANGINA PECTORIS: ICD-10-CM

## 2024-06-24 DIAGNOSIS — I10 ESSENTIAL (PRIMARY) HYPERTENSION: ICD-10-CM

## 2024-06-24 PROCEDURE — 99214 OFFICE O/P EST MOD 30 MIN: CPT

## 2024-06-24 PROCEDURE — G2211 COMPLEX E/M VISIT ADD ON: CPT

## 2024-06-24 PROCEDURE — 93000 ELECTROCARDIOGRAM COMPLETE: CPT

## 2024-07-15 ENCOUNTER — RX RENEWAL (OUTPATIENT)
Age: 79
End: 2024-07-15

## 2024-12-19 ENCOUNTER — NON-APPOINTMENT (OUTPATIENT)
Age: 79
End: 2024-12-19

## 2024-12-19 ENCOUNTER — APPOINTMENT (OUTPATIENT)
Dept: CARDIOLOGY | Facility: CLINIC | Age: 79
End: 2024-12-19
Payer: MEDICARE

## 2024-12-19 VITALS
OXYGEN SATURATION: 97 % | HEART RATE: 69 BPM | WEIGHT: 234 LBS | DIASTOLIC BLOOD PRESSURE: 77 MMHG | HEIGHT: 73 IN | SYSTOLIC BLOOD PRESSURE: 148 MMHG | BODY MASS INDEX: 31.01 KG/M2

## 2024-12-19 VITALS — DIASTOLIC BLOOD PRESSURE: 70 MMHG | SYSTOLIC BLOOD PRESSURE: 122 MMHG

## 2024-12-19 DIAGNOSIS — I25.10 ATHEROSCLEROTIC HEART DISEASE OF NATIVE CORONARY ARTERY W/OUT ANGINA PECTORIS: ICD-10-CM

## 2024-12-19 DIAGNOSIS — I11.9 HYPERTENSIVE HEART DISEASE W/OUT HEART FAILURE: ICD-10-CM

## 2024-12-19 PROCEDURE — 99214 OFFICE O/P EST MOD 30 MIN: CPT

## 2024-12-19 PROCEDURE — G2211 COMPLEX E/M VISIT ADD ON: CPT

## 2024-12-19 PROCEDURE — 93000 ELECTROCARDIOGRAM COMPLETE: CPT

## 2025-01-08 ENCOUNTER — APPOINTMENT (OUTPATIENT)
Dept: CARDIOLOGY | Facility: CLINIC | Age: 80
End: 2025-01-08
Payer: MEDICARE

## 2025-01-08 PROCEDURE — 93306 TTE W/DOPPLER COMPLETE: CPT

## 2025-02-03 ENCOUNTER — RX RENEWAL (OUTPATIENT)
Age: 80
End: 2025-02-03

## 2025-02-13 NOTE — CC
Detail Level: Simple Additional Notes: Discussed potentially doing a biopsy, if AK does not improve. Render Risk Assessment In Note?: no [DrFarheen  ___] : Dr. NOE  [DrFarheen ___] : Dr. NOE

## 2025-06-19 ENCOUNTER — APPOINTMENT (OUTPATIENT)
Dept: CARDIOLOGY | Facility: CLINIC | Age: 80
End: 2025-06-19
Payer: MEDICARE

## 2025-06-19 ENCOUNTER — NON-APPOINTMENT (OUTPATIENT)
Age: 80
End: 2025-06-19

## 2025-06-19 VITALS
SYSTOLIC BLOOD PRESSURE: 137 MMHG | WEIGHT: 232 LBS | DIASTOLIC BLOOD PRESSURE: 73 MMHG | HEIGHT: 73 IN | BODY MASS INDEX: 30.75 KG/M2 | OXYGEN SATURATION: 94 % | HEART RATE: 62 BPM

## 2025-06-19 VITALS — SYSTOLIC BLOOD PRESSURE: 114 MMHG | DIASTOLIC BLOOD PRESSURE: 62 MMHG

## 2025-06-19 PROCEDURE — 99214 OFFICE O/P EST MOD 30 MIN: CPT

## 2025-06-19 PROCEDURE — G2211 COMPLEX E/M VISIT ADD ON: CPT

## 2025-06-19 PROCEDURE — 93000 ELECTROCARDIOGRAM COMPLETE: CPT

## 2025-07-05 ENCOUNTER — INPATIENT (INPATIENT)
Facility: HOSPITAL | Age: 80
LOS: 5 days | Discharge: HOME | DRG: 603 | End: 2025-07-11
Attending: INTERNAL MEDICINE | Admitting: HOSPITALIST
Payer: MEDICARE

## 2025-07-05 VITALS
HEIGHT: 73 IN | WEIGHT: 229.94 LBS | HEART RATE: 68 BPM | SYSTOLIC BLOOD PRESSURE: 127 MMHG | TEMPERATURE: 98 F | DIASTOLIC BLOOD PRESSURE: 77 MMHG | OXYGEN SATURATION: 96 % | RESPIRATION RATE: 18 BRPM

## 2025-07-05 DIAGNOSIS — L08.9 LOCAL INFECTION OF THE SKIN AND SUBCUTANEOUS TISSUE, UNSPECIFIED: ICD-10-CM

## 2025-07-05 DIAGNOSIS — L03.90 CELLULITIS, UNSPECIFIED: ICD-10-CM

## 2025-07-05 LAB
ALBUMIN SERPL ELPH-MCNC: 3.3 G/DL — SIGNIFICANT CHANGE UP (ref 3.3–5)
ALP SERPL-CCNC: 99 U/L — SIGNIFICANT CHANGE UP (ref 40–120)
ALT FLD-CCNC: 14 U/L — SIGNIFICANT CHANGE UP (ref 12–78)
ANION GAP SERPL CALC-SCNC: 6 MMOL/L — SIGNIFICANT CHANGE UP (ref 5–17)
APPEARANCE UR: CLEAR — SIGNIFICANT CHANGE UP
APTT BLD: 39.3 SEC — HIGH (ref 26.1–36.8)
AST SERPL-CCNC: 14 U/L — LOW (ref 15–37)
BASOPHILS # BLD AUTO: 0.07 K/UL — SIGNIFICANT CHANGE UP (ref 0–0.2)
BASOPHILS NFR BLD AUTO: 1.1 % — SIGNIFICANT CHANGE UP (ref 0–2)
BILIRUB SERPL-MCNC: 1.1 MG/DL — SIGNIFICANT CHANGE UP (ref 0.2–1.2)
BILIRUB UR-MCNC: NEGATIVE — SIGNIFICANT CHANGE UP
BLD GP AB SCN SERPL QL: SIGNIFICANT CHANGE UP
BUN SERPL-MCNC: 22 MG/DL — SIGNIFICANT CHANGE UP (ref 7–23)
CALCIUM SERPL-MCNC: 9.3 MG/DL — SIGNIFICANT CHANGE UP (ref 8.5–10.1)
CHLORIDE SERPL-SCNC: 105 MMOL/L — SIGNIFICANT CHANGE UP (ref 96–108)
CO2 SERPL-SCNC: 24 MMOL/L — SIGNIFICANT CHANGE UP (ref 22–31)
COLOR SPEC: YELLOW — SIGNIFICANT CHANGE UP
CREAT SERPL-MCNC: 1.3 MG/DL — SIGNIFICANT CHANGE UP (ref 0.5–1.3)
DIFF PNL FLD: NEGATIVE — SIGNIFICANT CHANGE UP
EGFR: 57 ML/MIN/1.73M2 — LOW
EGFR: 57 ML/MIN/1.73M2 — LOW
EOSINOPHIL # BLD AUTO: 0.21 K/UL — SIGNIFICANT CHANGE UP (ref 0–0.5)
EOSINOPHIL NFR BLD AUTO: 3.3 % — SIGNIFICANT CHANGE UP (ref 0–6)
GLUCOSE SERPL-MCNC: 113 MG/DL — HIGH (ref 70–99)
GLUCOSE UR QL: NEGATIVE MG/DL — SIGNIFICANT CHANGE UP
HCT VFR BLD CALC: 45.4 % — SIGNIFICANT CHANGE UP (ref 39–50)
HGB BLD-MCNC: 15.1 G/DL — SIGNIFICANT CHANGE UP (ref 13–17)
IMM GRANULOCYTES # BLD AUTO: 0.03 K/UL — SIGNIFICANT CHANGE UP (ref 0–0.07)
IMM GRANULOCYTES NFR BLD AUTO: 0.5 % — SIGNIFICANT CHANGE UP (ref 0–0.9)
INR BLD: 1.22 RATIO — HIGH (ref 0.85–1.16)
KETONES UR QL: NEGATIVE MG/DL — SIGNIFICANT CHANGE UP
LACTATE SERPL-SCNC: 1 MMOL/L — SIGNIFICANT CHANGE UP (ref 0.7–2)
LEUKOCYTE ESTERASE UR-ACNC: NEGATIVE — SIGNIFICANT CHANGE UP
LYMPHOCYTES # BLD AUTO: 1.04 K/UL — SIGNIFICANT CHANGE UP (ref 1–3.3)
LYMPHOCYTES NFR BLD AUTO: 16.6 % — SIGNIFICANT CHANGE UP (ref 13–44)
MCHC RBC-ENTMCNC: 29.6 PG — SIGNIFICANT CHANGE UP (ref 27–34)
MCHC RBC-ENTMCNC: 33.3 G/DL — SIGNIFICANT CHANGE UP (ref 32–36)
MCV RBC AUTO: 89 FL — SIGNIFICANT CHANGE UP (ref 80–100)
MONOCYTES # BLD AUTO: 0.74 K/UL — SIGNIFICANT CHANGE UP (ref 0–0.9)
MONOCYTES NFR BLD AUTO: 11.8 % — SIGNIFICANT CHANGE UP (ref 2–14)
NEUTROPHILS # BLD AUTO: 4.18 K/UL — SIGNIFICANT CHANGE UP (ref 1.8–7.4)
NEUTROPHILS NFR BLD AUTO: 66.7 % — SIGNIFICANT CHANGE UP (ref 43–77)
NITRITE UR-MCNC: NEGATIVE — SIGNIFICANT CHANGE UP
NRBC # BLD AUTO: 0 K/UL — SIGNIFICANT CHANGE UP (ref 0–0)
NRBC # FLD: 0 K/UL — SIGNIFICANT CHANGE UP (ref 0–0)
NRBC BLD AUTO-RTO: 0 /100 WBCS — SIGNIFICANT CHANGE UP (ref 0–0)
PH UR: 5.5 — SIGNIFICANT CHANGE UP (ref 5–8)
PLATELET # BLD AUTO: 253 K/UL — SIGNIFICANT CHANGE UP (ref 150–400)
PMV BLD: 10.6 FL — SIGNIFICANT CHANGE UP (ref 7–13)
POTASSIUM SERPL-MCNC: 3.8 MMOL/L — SIGNIFICANT CHANGE UP (ref 3.5–5.3)
POTASSIUM SERPL-SCNC: 3.8 MMOL/L — SIGNIFICANT CHANGE UP (ref 3.5–5.3)
PROT SERPL-MCNC: 8.3 G/DL — SIGNIFICANT CHANGE UP (ref 6–8.3)
PROT UR-MCNC: NEGATIVE MG/DL — SIGNIFICANT CHANGE UP
PROTHROM AB SERPL-ACNC: 14.4 SEC — HIGH (ref 9.9–13.4)
RBC # BLD: 5.1 M/UL — SIGNIFICANT CHANGE UP (ref 4.2–5.8)
RBC # FLD: 14.4 % — SIGNIFICANT CHANGE UP (ref 10.3–14.5)
SODIUM SERPL-SCNC: 135 MMOL/L — SIGNIFICANT CHANGE UP (ref 135–145)
SP GR SPEC: 1.02 — SIGNIFICANT CHANGE UP (ref 1–1.03)
UROBILINOGEN FLD QL: 1 MG/DL — SIGNIFICANT CHANGE UP (ref 0.2–1)
WBC # BLD: 6.27 K/UL — SIGNIFICANT CHANGE UP (ref 3.8–10.5)
WBC # FLD AUTO: 6.27 K/UL — SIGNIFICANT CHANGE UP (ref 3.8–10.5)

## 2025-07-05 PROCEDURE — 93970 EXTREMITY STUDY: CPT | Mod: 26

## 2025-07-05 PROCEDURE — 73630 X-RAY EXAM OF FOOT: CPT | Mod: 26,RT

## 2025-07-05 PROCEDURE — 85730 THROMBOPLASTIN TIME PARTIAL: CPT

## 2025-07-05 PROCEDURE — 86900 BLOOD TYPING SEROLOGIC ABO: CPT

## 2025-07-05 PROCEDURE — 87070 CULTURE OTHR SPECIMN AEROBIC: CPT

## 2025-07-05 PROCEDURE — 73630 X-RAY EXAM OF FOOT: CPT

## 2025-07-05 PROCEDURE — 99222 1ST HOSP IP/OBS MODERATE 55: CPT

## 2025-07-05 PROCEDURE — 93010 ELECTROCARDIOGRAM REPORT: CPT

## 2025-07-05 PROCEDURE — 80053 COMPREHEN METABOLIC PANEL: CPT

## 2025-07-05 PROCEDURE — 93005 ELECTROCARDIOGRAM TRACING: CPT

## 2025-07-05 PROCEDURE — 71045 X-RAY EXAM CHEST 1 VIEW: CPT | Mod: 26

## 2025-07-05 PROCEDURE — 83605 ASSAY OF LACTIC ACID: CPT

## 2025-07-05 PROCEDURE — 93970 EXTREMITY STUDY: CPT

## 2025-07-05 PROCEDURE — 86850 RBC ANTIBODY SCREEN: CPT

## 2025-07-05 PROCEDURE — 71045 X-RAY EXAM CHEST 1 VIEW: CPT

## 2025-07-05 PROCEDURE — 73718 MRI LOWER EXTREMITY W/O DYE: CPT | Mod: 26,RT

## 2025-07-05 PROCEDURE — 85025 COMPLETE CBC W/AUTO DIFF WBC: CPT

## 2025-07-05 PROCEDURE — 85610 PROTHROMBIN TIME: CPT

## 2025-07-05 PROCEDURE — 87040 BLOOD CULTURE FOR BACTERIA: CPT

## 2025-07-05 PROCEDURE — 73718 MRI LOWER EXTREMITY W/O DYE: CPT

## 2025-07-05 PROCEDURE — 36415 COLL VENOUS BLD VENIPUNCTURE: CPT

## 2025-07-05 PROCEDURE — 81003 URINALYSIS AUTO W/O SCOPE: CPT

## 2025-07-05 PROCEDURE — 86901 BLOOD TYPING SEROLOGIC RH(D): CPT

## 2025-07-05 PROCEDURE — 99285 EMERGENCY DEPT VISIT HI MDM: CPT

## 2025-07-05 RX ORDER — ERGOCALCIFEROL 1.25 MG/1
1 CAPSULE ORAL
Refills: 0 | DISCHARGE

## 2025-07-05 RX ORDER — DULOXETINE 20 MG/1
60 CAPSULE, DELAYED RELEASE ORAL DAILY
Refills: 0 | Status: DISCONTINUED | OUTPATIENT
Start: 2025-07-05 | End: 2025-07-08

## 2025-07-05 RX ORDER — B1/B2/B3/B5/B6/B12/VIT C/FOLIC 500-0.5 MG
1 TABLET ORAL
Refills: 0 | DISCHARGE

## 2025-07-05 RX ORDER — CALCIUM CARBONATE 750 MG/1
2 TABLET ORAL
Refills: 0 | DISCHARGE

## 2025-07-05 RX ORDER — CEFTRIAXONE 500 MG/1
2000 INJECTION, POWDER, FOR SOLUTION INTRAMUSCULAR; INTRAVENOUS EVERY 24 HOURS
Refills: 0 | Status: DISCONTINUED | OUTPATIENT
Start: 2025-07-05 | End: 2025-07-08

## 2025-07-05 RX ORDER — CALCIUM CARBONATE 750 MG/1
1 TABLET ORAL DAILY
Refills: 0 | Status: DISCONTINUED | OUTPATIENT
Start: 2025-07-05 | End: 2025-07-08

## 2025-07-05 RX ORDER — MELATONIN 5 MG
3 TABLET ORAL AT BEDTIME
Refills: 0 | Status: DISCONTINUED | OUTPATIENT
Start: 2025-07-05 | End: 2025-07-08

## 2025-07-05 RX ORDER — LOSARTAN POTASSIUM 100 MG/1
25 TABLET, FILM COATED ORAL DAILY
Refills: 0 | Status: DISCONTINUED | OUTPATIENT
Start: 2025-07-05 | End: 2025-07-08

## 2025-07-05 RX ORDER — ENOXAPARIN SODIUM 100 MG/ML
40 INJECTION SUBCUTANEOUS EVERY 24 HOURS
Refills: 0 | Status: DISCONTINUED | OUTPATIENT
Start: 2025-07-05 | End: 2025-07-07

## 2025-07-05 RX ORDER — TAMSULOSIN HYDROCHLORIDE 0.4 MG/1
0.4 CAPSULE ORAL AT BEDTIME
Refills: 0 | Status: DISCONTINUED | OUTPATIENT
Start: 2025-07-05 | End: 2025-07-08

## 2025-07-05 RX ORDER — ACETAMINOPHEN 500 MG/5ML
650 LIQUID (ML) ORAL EVERY 6 HOURS
Refills: 0 | Status: DISCONTINUED | OUTPATIENT
Start: 2025-07-05 | End: 2025-07-08

## 2025-07-05 RX ORDER — PREDNISONE 20 MG/1
2.5 TABLET ORAL DAILY
Refills: 0 | Status: DISCONTINUED | OUTPATIENT
Start: 2025-07-05 | End: 2025-07-08

## 2025-07-05 RX ORDER — ONDANSETRON HCL/PF 4 MG/2 ML
4 VIAL (ML) INJECTION EVERY 8 HOURS
Refills: 0 | Status: DISCONTINUED | OUTPATIENT
Start: 2025-07-05 | End: 2025-07-08

## 2025-07-05 RX ORDER — VANCOMYCIN HCL IN 5 % DEXTROSE 1.5G/250ML
1000 PLASTIC BAG, INJECTION (ML) INTRAVENOUS ONCE
Refills: 0 | Status: COMPLETED | OUTPATIENT
Start: 2025-07-05 | End: 2025-07-05

## 2025-07-05 RX ORDER — SILODOSIN 4 MG/1
1 CAPSULE ORAL
Refills: 0 | DISCHARGE

## 2025-07-05 RX ORDER — PIPERACILLIN-TAZO-DEXTROSE,ISO 3.375G/5
3.38 IV SOLUTION, PIGGYBACK PREMIX FROZEN(ML) INTRAVENOUS ONCE
Refills: 0 | Status: COMPLETED | OUTPATIENT
Start: 2025-07-05 | End: 2025-07-05

## 2025-07-05 RX ORDER — POVIDONE-IODINE 7.5 %
1 SOLUTION, NON-ORAL TOPICAL ONCE
Refills: 0 | Status: COMPLETED | OUTPATIENT
Start: 2025-07-05 | End: 2025-07-05

## 2025-07-05 RX ORDER — LOSARTAN POTASSIUM 100 MG/1
1 TABLET, FILM COATED ORAL
Refills: 0 | DISCHARGE

## 2025-07-05 RX ORDER — ATORVASTATIN CALCIUM 80 MG/1
10 TABLET, FILM COATED ORAL AT BEDTIME
Refills: 0 | Status: DISCONTINUED | OUTPATIENT
Start: 2025-07-05 | End: 2025-07-08

## 2025-07-05 RX ORDER — MAGNESIUM, ALUMINUM HYDROXIDE 200-200 MG
30 TABLET,CHEWABLE ORAL EVERY 4 HOURS
Refills: 0 | Status: DISCONTINUED | OUTPATIENT
Start: 2025-07-05 | End: 2025-07-08

## 2025-07-05 RX ORDER — ATORVASTATIN CALCIUM 80 MG/1
1 TABLET, FILM COATED ORAL
Refills: 0 | DISCHARGE

## 2025-07-05 RX ADMIN — Medication 250 MILLIGRAM(S): at 09:45

## 2025-07-05 RX ADMIN — Medication 650 MILLIGRAM(S): at 22:46

## 2025-07-05 RX ADMIN — TAMSULOSIN HYDROCHLORIDE 0.4 MILLIGRAM(S): 0.4 CAPSULE ORAL at 21:46

## 2025-07-05 RX ADMIN — CEFTRIAXONE 100 MILLIGRAM(S): 500 INJECTION, POWDER, FOR SOLUTION INTRAMUSCULAR; INTRAVENOUS at 18:41

## 2025-07-05 RX ADMIN — Medication 2500 MILLILITER(S): at 08:40

## 2025-07-05 RX ADMIN — Medication 1 APPLICATION(S): at 08:46

## 2025-07-05 RX ADMIN — Medication 3 MILLIGRAM(S): at 21:46

## 2025-07-05 RX ADMIN — Medication 25 GRAM(S): at 13:19

## 2025-07-05 RX ADMIN — Medication 200 GRAM(S): at 08:40

## 2025-07-05 RX ADMIN — ATORVASTATIN CALCIUM 10 MILLIGRAM(S): 80 TABLET, FILM COATED ORAL at 21:46

## 2025-07-05 RX ADMIN — Medication 650 MILLIGRAM(S): at 21:46

## 2025-07-05 NOTE — CONSULT NOTE ADULT - SUBJECTIVE AND OBJECTIVE BOX
[facility]  Collin Engle MD PhD, Kusum Barrera MD, Shobha Magdaleno MD, Todd Brown MD, Alfa Sanchez MD  and providing coverage with Claudia Varela MD  Providing Infectious Disease Consultations at [facility], [facility], [facility]'s    Office# [phone] to schedule follow up appointments  Answering Service for urgent calls or New Consults [phone]  Cell# to text for urgent issues Collin Engle [phone]     infectious diseases consult note:    MATT SCHNEIDER is a 79y y. o. Male patient     HPI:  79m with PMR, CAD, GERD, HTN, HLD p/w foot/leg swelling.  Approximately 2 days ago he reports sustaining a cut to the toe #2 on his right foot..  He now has swelling to his right leg with foul-smelling discharge from his foot.  He denies any prior foot trauma.  He is not diabetic.  But he is on steroids.  Denies any cough fever chills.  He denies any cardiac respiratory symptoms.   (05 Jul 2025 15:17)      PAST MEDICAL & SURGICAL HISTORY:  Arthritis      GERD (gastroesophageal reflux disease)      PMR (polymyalgia rheumatica)  on steroids      FH: HTN (hypertension)      CAD (coronary artery disease)      Hiatal hernia      No significant past surgical history          Allergies    No Known Allergies    Intolerances        ANTIBIOTICS/RELEVANT:  antimicrobials    immunologic:    OTHER:  acetaminophen     Tablet .. 650 milliGRAM(s) Oral every 6 hours PRN  aluminum hydroxide/magnesium hydroxide/simethicone Suspension 30 milliLiter(s) Oral every 4 hours PRN  atorvastatin 10 milliGRAM(s) Oral at bedtime  calcium carbonate   1250 mG (OsCal) 1 Tablet(s) Oral daily  DULoxetine 60 milliGRAM(s) Oral daily  losartan 25 milliGRAM(s) Oral daily  melatonin 3 milliGRAM(s) Oral at bedtime PRN  ondansetron Injectable 4 milliGRAM(s) IV Push every 8 hours PRN  pantoprazole    Tablet 40 milliGRAM(s) Oral before breakfast  predniSONE   Tablet 2.5 milliGRAM(s) Oral daily  tamsulosin 0.4 milliGRAM(s) Oral at bedtime      Social History: no toxic habits reported    Family History: noncontrib        ROS:    EYES:  Negative  blurry vision or double vision  GASTROINTESTINAL:  Negative for nausea, vomiting, diarrhea  -otherwise negative except for subjective    Objective:  Last 24-Vital Signs Last 24 Hrs  T(C): 36.7 (05 Jul 2025 12:04), Max: 36.9 (05 Jul 2025 08:15)  T(F): 98.1 (05 Jul 2025 12:04), Max: 98.4 (05 Jul 2025 08:15)  HR: 68 (05 Jul 2025 06:42) (68 - 68)  BP: 133/68 (05 Jul 2025 12:04) (122/69 - 133/68)  BP(mean): --  RR: 18 (05 Jul 2025 12:04) (18 - 18)  SpO2: 98% (05 Jul 2025 12:04) (96% - 98%)    Parameters below as of 05 Jul 2025 12:04  Patient On (Oxygen Delivery Method): room air        T(C): 36.7 (07-05-25 @ 12:04), Max: 36.9 (07-05-25 @ 08:15)  T(F): 98.1 (07-05-25 @ 12:04), Max: 98.4 (07-05-25 @ 08:15)  T(C): 36.7 (07-05-25 @ 12:04), Max: 36.9 (07-05-25 @ 08:15)  T(F): 98.1 (07-05-25 @ 12:04), Max: 98.4 (07-05-25 @ 08:15)  T(C): 36.7 (07-05-25 @ 12:04), Max: 36.9 (07-05-25 @ 08:15)  T(F): 98.1 (07-05-25 @ 12:04), Max: 98.4 (07-05-25 @ 08:15)    PHYSICAL EXAM:  Constitutional: NAD  Eyes: PERRLA, EOMI  Ear/Nose/Throat: oropharynx normal	  Neck: no JVD, no lymphadenopathy, supple  Respiratory: no accessory muscle use, lung fields bilaterally clear  Cardiovascular: distant  Gastrointestinal: soft, NT, no HSM, BS-normal  Extremities: no clubbing, no cyanosis, edema right LE, drainage, red  Neuro: patient alert, oriented and appropriate    LABS:                        15.1   6.27  )-----------( 253      ( 05 Jul 2025 08:25 )             45.4       WBC 6.27  07-05 @ 08:25      07-05    135  |  105  |  22  ----------------------------<  113[H]  3.8   |  24  |  1.30    Ca    9.3      05 Jul 2025 08:25    TPro  8.3  /  Alb  3.3  /  TBili  1.1  /  DBili  x   /  AST  14[L]  /  ALT  14  /  AlkPhos  99  07-05      Creatinine: 1.30 mg/dL (07-05-25 @ 08:25)      PT/INR - ( 05 Jul 2025 08:25 )   PT: 14.4 sec;   INR: 1.22 ratio         PTT - ( 05 Jul 2025 08:25 )  PTT:39.3 sec  Urinalysis Basic - ( 05 Jul 2025 08:25 )    Color: x / Appearance: x / SG: x / pH: x  Gluc: 113 mg/dL / Ketone: x  / Bili: x / Urobili: x   Blood: x / Protein: x / Nitrite: x   Leuk Esterase: x / RBC: x / WBC x   Sq Epi: x / Non Sq Epi: x / Bacteria: x            MICROBIOLOGY:              RADIOLOGY & ADDITIONAL STUDIES:

## 2025-07-05 NOTE — CONSULT NOTE ADULT - ASSESSMENT
79m with PMR, CAD, GERD, HTN, HLD p/w foot/leg swelling.  Approximately 2 days ago he reports sustaining a cut to the toe #2 on his right foot..  He now has swelling to his right leg with foul-smelling discharge from his foot.  He denies any prior foot trauma.  He is not diabetic.  But he is on steroids.    Cellulitis and possible acute osteomyelitis right foot  pt with sig swelling ,drainage,  -agree with MRI  ordered nares MRSA  wound culture in lab  started Ceftriaxone 2 grams IV daily with recs to follow    Thank you for consulting us and involving us in the management of this most interesting and challenging case.  In addition to reviewing history, imaging, documents, labs, microbiology, took into account antibiotic stewardship, local antibiogram and infection control strategies and potential transmission issues.    We will follow along in the care of this patient. Please contact me by texting me directly on my cell# at [phone] using TEAMS or call our answering service at [phone] with any concerns.    
Assessment/Plan:  This is a 78 y/o M with PMH of HTN, non-obstructive CAD, GERD, PMR, and arthritis presented with right 2nd toe infection/cellulitis with concern for OM.  We are called for cardiac optimization as patient may require amputation    Cardiac Optimization, HTN  - Podiatry eval noted.  Patient for MRI to evaluate Rt foot for OM  - s/p Abx and fluid resuscitation in ED  - For possible amputation of Rt 2nd toe    - No evidence of volume overload, no cardiac ischemia, cardiac arrhythmias or significant valvular disease  - Had a recent TTE in our office showing normal LVF, EF 61% with no significant valvular pathology  - Had a LHC in 2021 showing non-obstructive CAD  - EKG showed NSR with Q waves in V1-3 but unchanged from previous.    - At this point, patient is considered optimized to undergo planned Podia surgery from cardiac standpoint  - Monitor electrolytes, replete to keep K>4 and Mag>2  - Will continue to follow     Paula De La Cruz DNP, NP-C, AGACNP-C  Cardiology  Call TEAMS        
Right 2nd digit infection  RLE cellulitis

## 2025-07-05 NOTE — H&P ADULT - NSHPPHYSICALEXAM_GEN_ALL_CORE
PHYSICAL EXAM:  GENERAL: NAD,   EYES: conjunctiva and sclera clear  ENMT: Moist mucous membranes  NECK: Supple, No JVD, Normal thyroid  CHEST/LUNG: non labored, cta b/l  HEART: Regular rate and rhythm; No murmurs, rubs, or gallops  ABDOMEN: Soft, Nontender, Nondistended; Bowel sounds present  EXTREMITIES:  2+ Peripheral Pulses, No clubbing, no cyanosis, +rt leg edema  LYMPH: No lymphadenopathy noted  SKIN: toe is erythematous

## 2025-07-05 NOTE — CONSULT NOTE ADULT - NS ATTEND AMEND GEN_ALL_CORE FT
This is a 78 y/o M with PMH of HTN, non-obstructive CAD, GERD, PMR, and arthritis presented with right 2nd toe infection/cellulitis with concern for OM.  We are called for cardiac optimization as patient may require amputation    - Podiatry eval noted. MRI foot positive for OM.   - s/p Abx and fluid resuscitation in ED  - For possible amputation of Rt 2nd toe  - No evidence of volume overload, no cardiac ischemia, cardiac arrhythmias or significant valvular disease  - Had a recent TTE in our office showing normal LVF, EF 61% with no significant valvular pathology  - Had a LHC in 2021 showing non-obstructive CAD  - EKG showed NSR with septal Q waves, unchanged from prior.

## 2025-07-05 NOTE — ED PROVIDER NOTE - PROGRESS NOTE DETAILS
podiatry service consulted Dr Georges mustafa podiatry service consulted Dr Stone mustafa Seen by podiatry Dr. Ritter service patient to be admitted for severe infection and surgery. Case discussed with Dr. Abreu attending medicine accepts patient for admission

## 2025-07-05 NOTE — H&P ADULT - HISTORY OF PRESENT ILLNESS
79m with PMR, CAD, GERD, HTN, HLD p/w foot/leg swelling.  Approximately 2 days ago he reports sustaining a cut to the toe #2 on his right foot..  He now has swelling to his right leg with foul-smelling discharge from his foot.  He denies any prior foot trauma.  He is not diabetic.  But he is on steroids.  Denies any cough fever chills.  He denies any cardiac respiratory symptoms.

## 2025-07-05 NOTE — ED PROVIDER NOTE - OBJECTIVE STATEMENT
PMD dr YUSRA Celeste, Cards dr JUANA estrada  Patient is a 79-year-old male who has a history of PMR on steroids, CAD, GERD, hiatal hernia, HTN, HLD.  Non-smoker social drinker no drug allergies.  Approximately 2 days ago he reports sustaining a cut to the toe #2 on his right foot.  Since that time he has not sought any care but tried to contact his primary care physician unable to see him until 2 days from now.  He now has swelling to his right leg with foul-smelling discharge from his foot.  He denies any prior foot trauma.  He is not diabetic.  But he is on steroids.  Denies any cough fever chills.  He denies any cardiac respiratory symptoms.  Accompanied by his family member for care and evaluation.

## 2025-07-05 NOTE — CONSULT NOTE ADULT - SUBJECTIVE AND OBJECTIVE BOX
[facility] - Jac Cunningham Pannella, Patel, Savella, Cohen Goodger  Office Number: [phone]    Initial Consult Note: This is a 78 y/o M with no cardiac Hx but PMH of HTN, non-obstructive CAD, GERD, PMR, and arthritis presented with right 2nd toe infection/cellulitis with concern for OM.  We are called for cardiac optimization as patient may require amputation    CHIEF COMPLAINT: Patient is a 79y old  Male who presents with a chief complaint of     HPI:    PAST MEDICAL & SURGICAL HISTORY:  Arthritis      GERD (gastroesophageal reflux disease)      PMR (polymyalgia rheumatica)  on steroids      FH: HTN (hypertension)      CAD (coronary artery disease)      Hiatal hernia      No significant past surgical history        SOCIAL HISTORY:  No tobacco, ethanol, or drug abuse.  FAMILY HISTORY:    No family history of acute MI or sudden cardiac death.  MEDICATIONS  (STANDING):  atorvastatin 10 milliGRAM(s) Oral at bedtime  calcium carbonate   1250 mG (OsCal) 1 Tablet(s) Oral daily  DULoxetine 60 milliGRAM(s) Oral daily  losartan 25 milliGRAM(s) Oral daily  pantoprazole    Tablet 40 milliGRAM(s) Oral before breakfast  piperacillin/tazobactam IVPB.. 3.375 Gram(s) IV Intermittent once  predniSONE   Tablet 2.5 milliGRAM(s) Oral daily  tamsulosin 0.4 milliGRAM(s) Oral at bedtime    MEDICATIONS  (PRN):  acetaminophen     Tablet .. 650 milliGRAM(s) Oral every 6 hours PRN Temp greater or equal to 38C (100.4F), Mild Pain (1 - 3)  aluminum hydroxide/magnesium hydroxide/simethicone Suspension 30 milliLiter(s) Oral every 4 hours PRN Dyspepsia  melatonin 3 milliGRAM(s) Oral at bedtime PRN Insomnia  ondansetron Injectable 4 milliGRAM(s) IV Push every 8 hours PRN Nausea and/or Vomiting    Allergies    No Known Allergies    Intolerances      REVIEW OF SYSTEMS:  CONSTITUTIONAL: No weakness, fevers or chills  EYES/ENT: No visual changes;  No vertigo or throat pain   NECK: No pain or stiffness  RESPIRATORY: No cough, wheezing, hemoptysis; No shortness of breath  CARDIOVASCULAR: No chest pain or palpitations  GASTROINTESTINAL: No abdominal pain. No nausea, vomiting, or hematemesis; No diarrhea or constipation. No melena or hematochezia.  GENITOURINARY: No dysuria, frequency or hematuria  NEUROLOGICAL: No numbness or weakness  SKIN: No itching or rash  All other review of systems is negative unless indicated above  VITAL SIGNS:   Vital Signs Last 24 Hrs  T(C): 36.1 (05 Jul 2025 08:45), Max: 36.9 (05 Jul 2025 08:15)  T(F): 97 (05 Jul 2025 08:45), Max: 98.4 (05 Jul 2025 08:15)  HR: 68 (05 Jul 2025 06:42) (68 - 68)  BP: 130/62 (05 Jul 2025 08:45) (122/69 - 130/62)  BP(mean): --  RR: 18 (05 Jul 2025 08:45) (18 - 18)  SpO2: 98% (05 Jul 2025 08:45) (96% - 98%)    Parameters below as of 05 Jul 2025 08:45  Patient On (Oxygen Delivery Method): room air      I&O's Summary    On Exam:  Constitutional: NAD, alert and oriented x 3  Lungs:  Non-labored, breath sounds are clear bilaterally, No wheezing, rales or rhonchi  Cardiovascular: RRR.  S1 and S2 positive.  No murmurs, rubs, gallops or clicks  Gastrointestinal: Bowel Sounds present, soft, nontender.   Lymph: No peripheral edema. No cervical lymphadenopathy.  Neurological: Alert, no focal deficits  Skin: No rashes or ulcers   Psych:  Mood & affect appropriate.    LABS: All Labs Reviewed:                        15.1   6.27  )-----------( 253      ( 05 Jul 2025 08:25 )             45.4     05 Jul 2025 08:25    135    |  105    |  22     ----------------------------<  113    3.8     |  24     |  1.30     Ca    9.3        05 Jul 2025 08:25    TPro  8.3    /  Alb  3.3    /  TBili  1.1    /  DBili  x      /  AST  14     /  ALT  14     /  AlkPhos  99     05 Jul 2025 08:25    PT/INR - ( 05 Jul 2025 08:25 )   PT: 14.4 sec;   INR: 1.22 ratio      PTT - ( 05 Jul 2025 08:25 )  PTT:39.3 sec    RADIOLOGY:  1/2025: EF 61%, no significant valvular disease  EKG: NSR with Q waves in V1-3, unchanged from 2/2022    Assessment/Plan:  This is a 78 y/o M with PMH of HTN, non-obstructive CAD, GERD, PMR, and arthritis presented with right 2nd toe infection/cellulitis with concern for OM.  We are called for cardiac optimization as patient may require amputation    Cardiac Optimization  -   Paula De La Cruz DNP, NP-C, AGACNP-C  Cardiology  Call TEAMS       [facility] - Jac Cunningham Pannella, Patel, Savella, Cohen Goodger  Office Number: [phone]    Initial Consult Note: This is a 78 y/o M with no cardiac Hx but PMH of HTN, non-obstructive CAD, GERD, PMR, and arthritis presented with right 2nd toe infection/cellulitis with concern for OM.  We are called for cardiac optimization as patient may require amputation    CHIEF COMPLAINT: Patient is a 79y old  Male who presents with a chief complaint of     HPI:    PAST MEDICAL & SURGICAL HISTORY:  Arthritis      GERD (gastroesophageal reflux disease)      PMR (polymyalgia rheumatica)  on steroids      FH: HTN (hypertension)      CAD (coronary artery disease)      Hiatal hernia      No significant past surgical history        SOCIAL HISTORY:  No tobacco, ethanol, or drug abuse.  FAMILY HISTORY:    No family history of acute MI or sudden cardiac death.  MEDICATIONS  (STANDING):  atorvastatin 10 milliGRAM(s) Oral at bedtime  calcium carbonate   1250 mG (OsCal) 1 Tablet(s) Oral daily  DULoxetine 60 milliGRAM(s) Oral daily  losartan 25 milliGRAM(s) Oral daily  pantoprazole    Tablet 40 milliGRAM(s) Oral before breakfast  piperacillin/tazobactam IVPB.. 3.375 Gram(s) IV Intermittent once  predniSONE   Tablet 2.5 milliGRAM(s) Oral daily  tamsulosin 0.4 milliGRAM(s) Oral at bedtime    MEDICATIONS  (PRN):  acetaminophen     Tablet .. 650 milliGRAM(s) Oral every 6 hours PRN Temp greater or equal to 38C (100.4F), Mild Pain (1 - 3)  aluminum hydroxide/magnesium hydroxide/simethicone Suspension 30 milliLiter(s) Oral every 4 hours PRN Dyspepsia  melatonin 3 milliGRAM(s) Oral at bedtime PRN Insomnia  ondansetron Injectable 4 milliGRAM(s) IV Push every 8 hours PRN Nausea and/or Vomiting    Allergies    No Known Allergies    Intolerances    REVIEW OF SYSTEMS:  CONSTITUTIONAL: No weakness, fevers or chills  EYES/ENT: No visual changes;  No vertigo or throat pain   NECK: No pain or stiffness  RESPIRATORY: No cough, wheezing, hemoptysis; No shortness of breath  CARDIOVASCULAR: No chest pain or palpitations  GASTROINTESTINAL: No abdominal pain. No nausea, vomiting, or hematemesis; No diarrhea or constipation. No melena or hematochezia.  GENITOURINARY: No dysuria, frequency or hematuria  NEUROLOGICAL: No numbness or weakness  SKIN: No itching or rash +laceration rt foot  All other review of systems is negative unless indicated above  VITAL SIGNS:   Vital Signs Last 24 Hrs  T(C): 36.1 (05 Jul 2025 08:45), Max: 36.9 (05 Jul 2025 08:15)  T(F): 97 (05 Jul 2025 08:45), Max: 98.4 (05 Jul 2025 08:15)  HR: 68 (05 Jul 2025 06:42) (68 - 68)  BP: 130/62 (05 Jul 2025 08:45) (122/69 - 130/62)  BP(mean): --  RR: 18 (05 Jul 2025 08:45) (18 - 18)  SpO2: 98% (05 Jul 2025 08:45) (96% - 98%)    Parameters below as of 05 Jul 2025 08:45  Patient On (Oxygen Delivery Method): room air      I&O's Summary    On Exam:  Constitutional: NAD, alert and oriented x 3  Lungs:  Non-labored, breath sounds are clear bilaterally, No wheezing, rales or rhonchi  Cardiovascular: RRR.  S1 and S2 positive.  No murmurs, rubs, gallops or clicks  Gastrointestinal: Bowel Sounds present, soft, nontender.   Lymph: No peripheral edema. No cervical lymphadenopathy.  Neurological: Alert, no focal deficits  Skin: No rashes or ulcers   Psych:  Mood & affect appropriate.    LABS: All Labs Reviewed:                        15.1   6.27  )-----------( 253      ( 05 Jul 2025 08:25 )             45.4     05 Jul 2025 08:25    135    |  105    |  22     ----------------------------<  113    3.8     |  24     |  1.30     Ca    9.3        05 Jul 2025 08:25    TPro  8.3    /  Alb  3.3    /  TBili  1.1    /  DBili  x      /  AST  14     /  ALT  14     /  AlkPhos  99     05 Jul 2025 08:25    PT/INR - ( 05 Jul 2025 08:25 )   PT: 14.4 sec;   INR: 1.22 ratio      PTT - ( 05 Jul 2025 08:25 )  PTT:39.3 sec    RADIOLOGY:  1/2025: EF 61%, no significant valvular disease  EKG: NSR with Q waves in V1-3, unchanged from 2/2022

## 2025-07-05 NOTE — ED PROVIDER NOTE - CLINICAL SUMMARY MEDICAL DECISION MAKING FREE TEXT BOX
Patient is a 79-year-old male who with a history of PMR on steroids, CAD, hiatal hernia, hypertension hyperlipidemia.  Presents with foul-smelling foot infection to the right foot secondary toe due to a laceration.  The foot is swollen secondary to this infection.  And there is reactive swelling to the lower leg.  Patient has no calf pain or tenderness.  He has no fever or chills.  Remainder physical examination unremarkable.  There is no lymphangitis.  No swollen or reactive lymphadenopathy.  Plan of care includes empiric antibiotics, x-rays to rule out osteomyelitis, wound cultures, consultation with foot surgery likely admission to the hospital.  This chart was made with dictation software and may contain typographical errors.

## 2025-07-05 NOTE — PATIENT PROFILE ADULT - FALL HARM RISK - HARM RISK INTERVENTIONS

## 2025-07-05 NOTE — ED ADULT TRIAGE NOTE - CHIEF COMPLAINT QUOTE
pt ambulatory to triage aox4 c/o swelling to r lower extremity since wednesday. denies cp, sob, difficulty breathing. denies cardiac hx.

## 2025-07-05 NOTE — ED ADULT NURSE NOTE - SUICIDE SCREENING QUESTION 3
[facility] [place]  [address] [address]                                                                                Phone [phone]                                                                                  Fax [phone]    Physical Therapy Reassessment/10 visit   Patient: Moriah Morris   : 1952  Diagnosis/ICD-10 Code:  Postoperative pain of left knee [G89.18, M25.562]  Referring practitioner: Jason Minaya MD  NPI: [id number]                                      Date of Initial Visit:  Type: THERAPY  Noted: 2024  Today's Date: 2025  Patient seen for 10 sessions         Visit Diagnoses:    ICD-10-CM ICD-9-CM   1. Postoperative pain of left knee  G89.18 338.18    M25.562 719.46   2. Decreased range of motion (ROM) of left knee  M25.662 719.56   3. Weakness of left leg  R29.898 729.89   4. S/P TKR (total knee replacement), left  Z96.652 V43.65         Subjective Questionnaire: LEFS:   Clinical Progress: improved  Home Program Compliance: Yes  Treatment has included: therapeutic exercise, manual therapy, therapeutic activity, and cryotherapy      Subjective   Moriah Morris reports: My knee is doing well and hope it's ok that I tapered my appts to once/week since I've been consistent with my HEP twice per day.  I     Objective          Observations   Left Knee   Negative for drainage and incision.       Active Range of Motion   Left Knee   Flexion: 121 degrees   Extension: 0 degrees   Extensor la degrees     Strength/Myotome Testing     Left Hip   Planes of Motion   Flexion: 5  Abduction: 5    Left Knee   Flexion: 5  Extension: 5  Quadriceps contraction: good    Tests   Left Ankle/Foot   Negative for Homans' sign.     Swelling     Left Knee Girth Measurement (cm)   Joint line: 40.8.    Right Knee Girth Measurement (cm)   Joint line: 40.    Ambulation   Weight-Bearing Status   Assistive device used: none    Ambulation: Stairs  "  Pattern: reciprocal  Railings: two rails  Descend stairs: independent  Pattern: reciprocal  Railings: two rails    Additional Stairs Ambulation Details  Mild L knee pain descending the 6 in therapy staircase    Observational Gait   Gait: within functional limits     Functional Assessment     Forward Step Up 8\"   Left Leg  No pain.     Single Leg Stance   Left: 30 seconds    Comments  5TSTS - 7.84 sec           Assessment/Plan  Moriah has made excellent progress in physical therapy with ROM, gait and balance s/p L TKA on 11/22/24.  She was able to reciprocally descend our 6 in therapy staircase though descends her home staircase cautiously and non-reciprocally.  Her calf is soft and non tender and jt effusion is appropriate. She is diligent and compliant with her HEP and should continue to progress with weekly PT appts until her next post op visit.     Goals  Plan Goals: STG: ~6 weeks  1. Pt will be demonstrate 0-120 degrees of knee extension and flexion to be able to sit in a chair properly - MET  2. Pt will improve LEFS score to 40/80 to improve subjective function of LE with ADLs - MET  3. Pt will be able to walk for 20 minutes to be able to navigate grocery store  - MET  4. Pt will demonstrate a SLR without extensor lag to improve terminal knee extension - MET     LTG: ~12 weeks  1. Pt will decreased L knee joint swelling from 41 cm to 38 cm - ONGOING - measured at mid patella  2. Pt will be able to complete basement steps with single HR and reciprocal pattern ascending/descending - ONGOING  3. Pt will be able to walk for 30 minutes  or 1 mile with L knee pain <2/10 - ONGOING  4. Pt will improve knee extensor and knee flexor strength to 5/5 to be able to complete transfers without UE assistance - MET  5. Pt will be I with HEP - MET     Recommendations: Continue with recommendations ok to taper to once a week until post op visit on 1/17/25 and assess for possible discharge  Timeframe:  3-4 weeks  Prognosis to " achieve goals: good      Timed:         Manual Therapy:         mins  69123;     Therapeutic Exercise:   30      mins  35635;     Neuromuscular Pearl:        mins  81185;    Therapeutic Activity:    20      mins  57079;     Gait Training:           mins  46269;     Ultrasound:          mins  74974;    Ionto                                   mins   83460  Self Care                            mins   63911      Timed Treatment: 50     mins   Total Treatment:    60    mins          PT: Keya Stuart PT     [place] License:  [id number]    Electronically signed by Keya Stuart PT, 1/2/2025,3:35 PM EST    Certification Period: 1/2/2025 thru 4/1/2025  I certify that the therapy services are furnished while this patient is under my care.  The services outlined above are required by this patient, and will be reviewed every 90 days.         Physician Signature:__________________________________________________    PHYSICIAN: Jason Minaya MD  NPI: [id number]                                      DATE:  :     Please sign and return via fax to [phone].  Thank you, [facility]        No

## 2025-07-05 NOTE — CONSULT NOTE ADULT - PROBLEM SELECTOR RECOMMENDATION 9
Chart reviewed and Patient evaluated.  Discussed diagnosis and treatment with patient.  Concern for right 2nd digit infection with cellulitis to RLE.  Wound was irrigated with betadine solution.   Wounds evaluated and dressed with betadine DSD.  Obtained wound culture to be sent to Pathology.  X-rays reviewed : Shows no subcutaneous emphysema or other acute fracture pathology noted. Osteolytic changes noted to right 2nd digit medial aspect of proximal and middle phalanges.   MRI ordered to assess for OM.  Pt TBA to medicine service.   Continue with IV antibiotics and modify As Per ID recs.  Based on clinical evaluation, patient may require amputation of right 2nd digit this week.  Please document necessary medical/cardiac clearances.  PWB to right heel.   Offloading to bilateral Heels.   Podiatry will follow while in house.  Will discuss care plan with attending.

## 2025-07-05 NOTE — ED PROVIDER NOTE - CARE PLAN
Principal Discharge DX:	Right foot infection   1 Principal Discharge DX:	Right foot infection  Secondary Diagnosis:	Chronic bilateral pleural effusions

## 2025-07-05 NOTE — ED PROVIDER NOTE - CARDIAC, MLM
Normal rate, regular rhythm.  Heart sounds S1, S2.  No murmurs, rubs or gallops. rl lower ext swelling from foot to just prox to ankle

## 2025-07-05 NOTE — CONSULT NOTE ADULT - SUBJECTIVE AND OBJECTIVE BOX
Chief Complaint : Patient is a 79y old  Male who presents with a chief complaint of right foot infection   HPI : Patient is a 79-year-old male who has a history of PMR on steroids, CAD, GERD, hiatal hernia, HTN, HLD.  Non-smoker social drinker no drug allergies.  Approximately 2 days ago he reports sustaining a cut to the toe #2 on his right foot.  Since that time he has not sought any care but tried to contact his primary care physician unable to see him until 2 days from now.  He now has swelling to his right leg with foul-smelling discharge from his foot.  He denies any prior foot trauma.  He is not diabetic.  But he is on steroids.  Denies any cough fever chills.  He denies any cardiac respiratory symptoms.  Accompanied by his family member for care and evaluation.      Patient admits to  (-) Fevers, (-) Chills, (-) Nausea, (-) Vomiting, (-) Shortness of Breath      PMH: Arthritis    GERD (gastroesophageal reflux disease)    PMR (polymyalgia rheumatica)    FH: HTN (hypertension)    CAD (coronary artery disease)    Hiatal hernia      PSH:No significant past surgical history        Allergies:No Known Allergies      Labs:                          15.1   6.27  )-----------( 253      ( 05 Jul 2025 08:25 )             45.4     WBC Trend  6.27 Date (07-05 @ 08:25)      Chem              T(F): 97 (07-05-25 @ 08:45), Max: 98.4 (07-05-25 @ 08:15)  HR: 68 (07-05-25 @ 06:42) (68 - 68)  BP: 130/62 (07-05-25 @ 08:45) (122/69 - 130/62)  RR: 18 (07-05-25 @ 08:45) (18 - 18)  SpO2: 98% (07-05-25 @ 08:45) (96% - 98%)  Wt(kg): --    O:   General: Pleasant  male NAD & AOX3.    Integument: Ulceration noted to right 2nd digit medial aspect, - hyperkeratotic border, wound base fibrotic, wound size (1.5 cm X 1.5cm X 0.5cm) + edema, + karthikeyan-wound erythema, + purulence, - fluctuance, + tracking/tunneling, + probe to bone. Cellulitis noted to RLE extending from 2nd digit up to proximal leg, with significant edema.   Vascular: Dorsalis Pedis and Posterior Tibial pulses 2/4.  Capillary re-fill time less then 3 seconds digits 1-5 bilateral.    Neuro: Protective sensation intact to the level of the digits bilateral.  MSK: Muscle strength 5/5 all major muscle groups bilateral.

## 2025-07-05 NOTE — ED PROVIDER NOTE - MUSCULOSKELETAL, MLM
Spine appears normal, range of motion is not limited  pt has foul smelling discharge and laceration with oozing to toe#2 r foot with reactive swelling of foot toe and lower leg as noted above

## 2025-07-05 NOTE — H&P ADULT - ASSESSMENT
79m with PMR, CAD, GERD, HTN, HLD p/w  79m with PMR, CAD, GERD, HTN, HLD p/w toe infection  MRI c/w osteo and septic arthritis  Podiatry and ID following  empiric abx  medically acceptable for surgery    CAD/HTN/HLD  continue lipitor, losartan    BPH  continue flomax    GERD  continue protonix    PMR  continue prednisone

## 2025-07-05 NOTE — ED ADULT NURSE NOTE - OBJECTIVE STATEMENT
A&Ox4 from triage with c/o right leg swelling. has wound to right foot which he states he first noticed a couple of days ago. unsure how wound occurred. pus noted in between right 1st and second toes. denies fevers/chills.

## 2025-07-06 DIAGNOSIS — M86.10 OTHER ACUTE OSTEOMYELITIS, UNSPECIFIED SITE: ICD-10-CM

## 2025-07-06 LAB
ANION GAP SERPL CALC-SCNC: 8 MMOL/L — SIGNIFICANT CHANGE UP (ref 5–17)
BUN SERPL-MCNC: 18 MG/DL — SIGNIFICANT CHANGE UP (ref 7–23)
CALCIUM SERPL-MCNC: 8.7 MG/DL — SIGNIFICANT CHANGE UP (ref 8.5–10.1)
CHLORIDE SERPL-SCNC: 108 MMOL/L — SIGNIFICANT CHANGE UP (ref 96–108)
CO2 SERPL-SCNC: 22 MMOL/L — SIGNIFICANT CHANGE UP (ref 22–31)
CREAT SERPL-MCNC: 1.2 MG/DL — SIGNIFICANT CHANGE UP (ref 0.5–1.3)
EGFR: 62 ML/MIN/1.73M2 — SIGNIFICANT CHANGE UP
EGFR: 62 ML/MIN/1.73M2 — SIGNIFICANT CHANGE UP
ERYTHROCYTE [SEDIMENTATION RATE] IN BLOOD: 36 MM/HR — HIGH (ref 0–15)
GLUCOSE SERPL-MCNC: 110 MG/DL — HIGH (ref 70–99)
GRAM STN FLD: ABNORMAL
GRAM STN FLD: SIGNIFICANT CHANGE UP
HCT VFR BLD CALC: 40.5 % — SIGNIFICANT CHANGE UP (ref 39–50)
HGB BLD-MCNC: 13.6 G/DL — SIGNIFICANT CHANGE UP (ref 13–17)
MCHC RBC-ENTMCNC: 29.8 PG — SIGNIFICANT CHANGE UP (ref 27–34)
MCHC RBC-ENTMCNC: 33.6 G/DL — SIGNIFICANT CHANGE UP (ref 32–36)
MCV RBC AUTO: 88.6 FL — SIGNIFICANT CHANGE UP (ref 80–100)
MRSA PCR RESULT.: SIGNIFICANT CHANGE UP
NRBC # BLD AUTO: 0 K/UL — SIGNIFICANT CHANGE UP (ref 0–0)
NRBC # FLD: 0 K/UL — SIGNIFICANT CHANGE UP (ref 0–0)
NRBC BLD AUTO-RTO: 0 /100 WBCS — SIGNIFICANT CHANGE UP (ref 0–0)
PLATELET # BLD AUTO: 244 K/UL — SIGNIFICANT CHANGE UP (ref 150–400)
PMV BLD: 10.8 FL — SIGNIFICANT CHANGE UP (ref 7–13)
POTASSIUM SERPL-MCNC: 4 MMOL/L — SIGNIFICANT CHANGE UP (ref 3.5–5.3)
POTASSIUM SERPL-SCNC: 4 MMOL/L — SIGNIFICANT CHANGE UP (ref 3.5–5.3)
RBC # BLD: 4.57 M/UL — SIGNIFICANT CHANGE UP (ref 4.2–5.8)
RBC # FLD: 14.6 % — HIGH (ref 10.3–14.5)
S AUREUS DNA NOSE QL NAA+PROBE: DETECTED
SODIUM SERPL-SCNC: 138 MMOL/L — SIGNIFICANT CHANGE UP (ref 135–145)
SPECIMEN SOURCE: SIGNIFICANT CHANGE UP
WBC # BLD: 6.71 K/UL — SIGNIFICANT CHANGE UP (ref 3.8–10.5)
WBC # FLD AUTO: 6.71 K/UL — SIGNIFICANT CHANGE UP (ref 3.8–10.5)

## 2025-07-06 PROCEDURE — 87641 MR-STAPH DNA AMP PROBE: CPT

## 2025-07-06 PROCEDURE — 86850 RBC ANTIBODY SCREEN: CPT

## 2025-07-06 PROCEDURE — 93005 ELECTROCARDIOGRAM TRACING: CPT

## 2025-07-06 PROCEDURE — 85025 COMPLETE CBC W/AUTO DIFF WBC: CPT

## 2025-07-06 PROCEDURE — 73630 X-RAY EXAM OF FOOT: CPT

## 2025-07-06 PROCEDURE — 85027 COMPLETE CBC AUTOMATED: CPT

## 2025-07-06 PROCEDURE — 85652 RBC SED RATE AUTOMATED: CPT

## 2025-07-06 PROCEDURE — 93970 EXTREMITY STUDY: CPT

## 2025-07-06 PROCEDURE — 80048 BASIC METABOLIC PNL TOTAL CA: CPT

## 2025-07-06 PROCEDURE — 71045 X-RAY EXAM CHEST 1 VIEW: CPT

## 2025-07-06 PROCEDURE — 87070 CULTURE OTHR SPECIMN AEROBIC: CPT

## 2025-07-06 PROCEDURE — 86900 BLOOD TYPING SEROLOGIC ABO: CPT

## 2025-07-06 PROCEDURE — 87640 STAPH A DNA AMP PROBE: CPT

## 2025-07-06 PROCEDURE — 86901 BLOOD TYPING SEROLOGIC RH(D): CPT

## 2025-07-06 PROCEDURE — 87077 CULTURE AEROBIC IDENTIFY: CPT

## 2025-07-06 PROCEDURE — 85730 THROMBOPLASTIN TIME PARTIAL: CPT

## 2025-07-06 PROCEDURE — 87040 BLOOD CULTURE FOR BACTERIA: CPT

## 2025-07-06 PROCEDURE — 83605 ASSAY OF LACTIC ACID: CPT

## 2025-07-06 PROCEDURE — 80053 COMPREHEN METABOLIC PANEL: CPT

## 2025-07-06 PROCEDURE — 73718 MRI LOWER EXTREMITY W/O DYE: CPT

## 2025-07-06 PROCEDURE — 36415 COLL VENOUS BLD VENIPUNCTURE: CPT

## 2025-07-06 PROCEDURE — 85610 PROTHROMBIN TIME: CPT

## 2025-07-06 PROCEDURE — 81003 URINALYSIS AUTO W/O SCOPE: CPT

## 2025-07-06 RX ADMIN — Medication 650 MILLIGRAM(S): at 21:49

## 2025-07-06 RX ADMIN — PREDNISONE 2.5 MILLIGRAM(S): 20 TABLET ORAL at 05:59

## 2025-07-06 RX ADMIN — DULOXETINE 60 MILLIGRAM(S): 20 CAPSULE, DELAYED RELEASE ORAL at 12:43

## 2025-07-06 RX ADMIN — Medication 40 MILLIGRAM(S): at 06:02

## 2025-07-06 RX ADMIN — ATORVASTATIN CALCIUM 10 MILLIGRAM(S): 80 TABLET, FILM COATED ORAL at 21:48

## 2025-07-06 RX ADMIN — Medication 650 MILLIGRAM(S): at 22:49

## 2025-07-06 RX ADMIN — LOSARTAN POTASSIUM 25 MILLIGRAM(S): 100 TABLET, FILM COATED ORAL at 05:59

## 2025-07-06 RX ADMIN — CALCIUM CARBONATE 1 TABLET(S): 750 TABLET ORAL at 12:43

## 2025-07-06 RX ADMIN — CEFTRIAXONE 100 MILLIGRAM(S): 500 INJECTION, POWDER, FOR SOLUTION INTRAMUSCULAR; INTRAVENOUS at 17:45

## 2025-07-06 RX ADMIN — TAMSULOSIN HYDROCHLORIDE 0.4 MILLIGRAM(S): 0.4 CAPSULE ORAL at 21:48

## 2025-07-06 RX ADMIN — ENOXAPARIN SODIUM 40 MILLIGRAM(S): 100 INJECTION SUBCUTANEOUS at 05:58

## 2025-07-06 RX ADMIN — Medication 3 MILLIGRAM(S): at 21:48

## 2025-07-06 NOTE — PROGRESS NOTE ADULT - SUBJECTIVE AND OBJECTIVE BOX
PROGRESS NOTE   Patient is a 79y old  Male who presents with a chief complaint of right foot infection    HPI:  79m with PMR, CAD, GERD, HTN, HLD p/w foot/leg swelling.  Approximately 2 days ago he reports sustaining a cut to the toe #2 on his right foot..  He now has swelling to his right leg with foul-smelling discharge from his foot.  He denies any prior foot trauma.  He is not diabetic.  But he is on steroids.  Denies any cough fever chills.  He denies any cardiac respiratory symptoms.   (05 Jul 2025 15:17)      Vital Signs Last 24 Hrs  T(C): 36.8 (06 Jul 2025 11:47), Max: 36.8 (05 Jul 2025 22:02)  T(F): 98.2 (06 Jul 2025 11:47), Max: 98.3 (05 Jul 2025 22:02)  HR: 70 (06 Jul 2025 11:47) (64 - 71)  BP: 104/66 (06 Jul 2025 11:47) (104/66 - 123/66)  BP(mean): --  RR: 18 (06 Jul 2025 11:47) (18 - 18)  SpO2: 95% (06 Jul 2025 11:47) (94% - 96%)    Parameters below as of 06 Jul 2025 11:47  Patient On (Oxygen Delivery Method): room air                              13.6   6.71  )-----------( 244      ( 06 Jul 2025 08:00 )             40.5               07-06    138  |  108  |  18  ----------------------------<  110[H]  4.0   |  22  |  1.20    Ca    8.7      06 Jul 2025 08:00    TPro  8.3  /  Alb  3.3  /  TBili  1.1  /  DBili  x   /  AST  14[L]  /  ALT  14  /  AlkPhos  99  07-05        General: Pleasant  male NAD & AOX3.    Integument: Ulceration noted to right 2nd digit medial aspect, - hyperkeratotic border, wound base granulofibrotic, wound size (1.5 cm X 1.5cm X 0.5cm) positive edema, positive karthikeyan-wound erythema, positive purulence, negative fluctuance, positive tracking/tunneling, positive probe to bone, positive mal odor, clinical concern for Osteomyelitis. Noted resolving cellulitic changes to RLE, localized to the 2nd digit right foot   Vascular: Dorsalis Pedis and Posterior Tibial pulses 2/4.  Capillary re-fill time less then 3 seconds digits 1-5 bilateral.    Neuro: Protective sensation diminished to the level of the digits bilateral.  MSK: Muscle strength 5/5 all major muscle groups bilateral.      MRI right foot  IMPRESSION:  1. Soft tissue wound in the medial aspect of second toe extending to the second PIP joint with associated septic arthritis.  2. Acute osteomyelitis of the phalanges of the second toe.

## 2025-07-06 NOTE — PROGRESS NOTE ADULT - SUBJECTIVE AND OBJECTIVE BOX
[facility]  Collin Engle MD PhD, Kusum Barrera MD, Shobha Magdaleno MD, Todd Brown MD, Alfa Sanchez MD  and providing coverage with Claudia Varela MD  Providing Infectious Disease Consultations at [facility], [facility], [facility], [facility]'s    Office# [phone] to schedule follow up appointments  Answering Service for urgent calls or New Consults [phone]  Cell# to text for urgent issues Collin Engle [phone]     infectious diseases progress note:    MATT SCHNEIDER is a 79y y. o. Male patient    Overnight and events of the last 24hrs reviewed    Allergies    No Known Allergies    Intolerances        ANTIBIOTICS/RELEVANT:  antimicrobials  cefTRIAXone   IVPB 2000 milliGRAM(s) IV Intermittent every 24 hours    immunologic:    OTHER:  acetaminophen     Tablet .. 650 milliGRAM(s) Oral every 6 hours PRN  aluminum hydroxide/magnesium hydroxide/simethicone Suspension 30 milliLiter(s) Oral every 4 hours PRN  atorvastatin 10 milliGRAM(s) Oral at bedtime  calcium carbonate   1250 mG (OsCal) 1 Tablet(s) Oral daily  DULoxetine 60 milliGRAM(s) Oral daily  enoxaparin Injectable 40 milliGRAM(s) SubCutaneous every 24 hours  losartan 25 milliGRAM(s) Oral daily  melatonin 3 milliGRAM(s) Oral at bedtime PRN  ondansetron Injectable 4 milliGRAM(s) IV Push every 8 hours PRN  pantoprazole    Tablet 40 milliGRAM(s) Oral before breakfast  predniSONE   Tablet 2.5 milliGRAM(s) Oral daily  tamsulosin 0.4 milliGRAM(s) Oral at bedtime      Objective:  Vital Signs Last 24 Hrs  T(C): 36.7 (06 Jul 2025 05:41), Max: 36.8 (05 Jul 2025 22:02)  T(F): 98.1 (06 Jul 2025 05:41), Max: 98.3 (05 Jul 2025 22:02)  HR: 64 (06 Jul 2025 05:41) (64 - 71)  BP: 111/64 (06 Jul 2025 05:41) (111/64 - 133/68)  BP(mean): --  RR: 18 (06 Jul 2025 05:41) (18 - 18)  SpO2: 94% (06 Jul 2025 05:41) (94% - 98%)    Parameters below as of 06 Jul 2025 05:41  Patient On (Oxygen Delivery Method): room air        T(C): 36.7 (07-06-25 @ 05:41), Max: 36.9 (07-05-25 @ 08:15)  T(C): 36.7 (07-06-25 @ 05:41), Max: 36.9 (07-05-25 @ 08:15)  T(C): 36.7 (07-06-25 @ 05:41), Max: 36.9 (07-05-25 @ 08:15)    PHYSICAL EXAM:  HEENT: NC atraumatic  Neck: supple  Respiratory: no accessory muscle use, breathing comfortably  Cardiovascular: distant  Gastrointestinal: normal appearing, nondistended  Extremities: no clubbing, no cyanosis,        LABS:                          13.6   6.71  )-----------( 244      ( 06 Jul 2025 08:00 )             40.5       WBC  6.71 07-06 @ 08:00  6.27 07-05 @ 08:25      07-06    138  |  108  |  18  ----------------------------<  110[H]  4.0   |  22  |  1.20    Ca    8.7      06 Jul 2025 08:00    TPro  8.3  /  Alb  3.3  /  TBili  1.1  /  DBili  x   /  AST  14[L]  /  ALT  14  /  AlkPhos  99  07-05      Creatinine: 1.20 mg/dL (07-06-25 @ 08:00)  Creatinine: 1.30 mg/dL (07-05-25 @ 08:25)      PT/INR - ( 05 Jul 2025 08:25 )   PT: 14.4 sec;   INR: 1.22 ratio         PTT - ( 05 Jul 2025 08:25 )  PTT:39.3 sec  Urinalysis Basic - ( 06 Jul 2025 08:00 )    Color: x / Appearance: x / SG: x / pH: x  Gluc: 110 mg/dL / Ketone: x  / Bili: x / Urobili: x   Blood: x / Protein: x / Nitrite: x   Leuk Esterase: x / RBC: x / WBC x   Sq Epi: x / Non Sq Epi: x / Bacteria: x            INFLAMMATORY MARKERS      MICROBIOLOGY:    MRSA/MSSA PCR (07.05.25 @ 17:55)    MRSA PCR Result.: NotDetec:   Staph aureus PCR Result: Detected    Culture - Wound Aerobic/Anaerobic (07.05.25 @ 09:59)    Gram Stain:   No polymorphonuclear leukocytes per low power field  No organisms seen per oil power field   Specimen Source: Swab Swab        RADIOLOGY & ADDITIONAL STUDIES:  < from: MR Foot No Cont, Right (07.05.25 @ 15:19) >    ACC: [id number] EXAM:  MR FOOT RT   ORDERED BY: GURDEEP ARENAS     PROCEDURE DATE:  07/05/2025          INTERPRETATION:  RIGHT FOOT MRI    CLINICAL INFORMATION: Second toe infection.  TECHNIQUE: Multiplanar, multisequence MRI was obtained of the RIGHT foot   without the use of intravenous or intra-articular contrast.  COMPARISON: Right foot radiographs 7/5/2025.    FINDINGS:    PERIPHERAL SOFT TISSUES: Soft tissue wound in the medial aspect of the   second toe at the level of the PIP joint. Woundextends through the   subcutaneous fat to the level of the PIP joint. No associated abscess.   Diffuse edema in the forefoot. No drainable fluid collection.  BONE MARROW: Increased STIR signal with loss of T1 hyperintense signal in   the phalanges ofthe second toe. Marrow signal is otherwise maintained.    MUSCLES AND TENDONS: Tendons are intact. Diffuse edema and atrophy of the   intrinsic muscles of the foot.  LIGAMENTS AND CAPSULAR STRUCTURES: Intact.  CARTILAGE AND SUBCHONDRAL BONE: Chondralloss in the PIP joint of the   second toe.  SYNOVIUM/JOINT FLUID: Second PIP joint effusion.      IMPRESSION:  1.  Soft tissue wound in the medial aspect of second toe extending to the   second PIP joint with associated septic arthritis.  2.  Acute osteomyelitis of the phalanges of the second toe.

## 2025-07-06 NOTE — PROGRESS NOTE ADULT - SUBJECTIVE AND OBJECTIVE BOX
Patient is a 79y old  Male who presents with a chief complaint of       INTERVAL HPI/OVERNIGHT EVENTS:   no complaints  pt seen and examined         Vital Signs Last 24 Hrs  T(C): 36.8 (06 Jul 2025 11:47), Max: 36.8 (05 Jul 2025 22:02)  T(F): 98.2 (06 Jul 2025 11:47), Max: 98.3 (05 Jul 2025 22:02)  HR: 70 (06 Jul 2025 11:47) (64 - 71)  BP: 104/66 (06 Jul 2025 11:47) (104/66 - 123/66)  BP(mean): --  RR: 18 (06 Jul 2025 11:47) (18 - 18)  SpO2: 95% (06 Jul 2025 11:47) (94% - 96%)    Parameters below as of 06 Jul 2025 11:47  Patient On (Oxygen Delivery Method): room air        acetaminophen     Tablet .. 650 milliGRAM(s) Oral every 6 hours PRN  aluminum hydroxide/magnesium hydroxide/simethicone Suspension 30 milliLiter(s) Oral every 4 hours PRN  atorvastatin 10 milliGRAM(s) Oral at bedtime  calcium carbonate   1250 mG (OsCal) 1 Tablet(s) Oral daily  cefTRIAXone   IVPB 2000 milliGRAM(s) IV Intermittent every 24 hours  DULoxetine 60 milliGRAM(s) Oral daily  enoxaparin Injectable 40 milliGRAM(s) SubCutaneous every 24 hours  losartan 25 milliGRAM(s) Oral daily  melatonin 3 milliGRAM(s) Oral at bedtime PRN  ondansetron Injectable 4 milliGRAM(s) IV Push every 8 hours PRN  pantoprazole    Tablet 40 milliGRAM(s) Oral before breakfast  predniSONE   Tablet 2.5 milliGRAM(s) Oral daily  tamsulosin 0.4 milliGRAM(s) Oral at bedtime      PHYSICAL EXAM:  GENERAL: NAD   EYES: conjunctiva and sclera clear  ENMT: Moist mucous membranes  NECK: Supple, No JVD, Normal thyroid  CHEST/LUNG: non labored, cta b/l  HEART: Regular rate and rhythm; No murmurs, rubs, or gallops  ABDOMEN: Soft, Nontender, Nondistended; Bowel sounds present  EXTREMITIES:  No clubbing, no cyanosis, no edema  LYMPH: No lymphadenopathy noted  SKIN: No rashes or lesions  NEURO: no new focal deficits    Consultant(s) Notes Reviewed:  [x ] YES  [ ] NO  Care Discussed with Consultants/Other Providers [ x] YES  [ ] NO    LABS:                        13.6   6.71  )-----------( 244      ( 06 Jul 2025 08:00 )             40.5     07-06    138  |  108  |  18  ----------------------------<  110[H]  4.0   |  22  |  1.20    Ca    8.7      06 Jul 2025 08:00    TPro  8.3  /  Alb  3.3  /  TBili  1.1  /  DBili  x   /  AST  14[L]  /  ALT  14  /  AlkPhos  99  07-05    PT/INR - ( 05 Jul 2025 08:25 )   PT: 14.4 sec;   INR: 1.22 ratio         PTT - ( 05 Jul 2025 08:25 )  PTT:39.3 sec  Urinalysis Basic - ( 06 Jul 2025 08:00 )    Color: x / Appearance: x / SG: x / pH: x  Gluc: 110 mg/dL / Ketone: x  / Bili: x / Urobili: x   Blood: x / Protein: x / Nitrite: x   Leuk Esterase: x / RBC: x / WBC x   Sq Epi: x / Non Sq Epi: x / Bacteria: x      CAPILLARY BLOOD GLUCOSE            Urinalysis Basic - ( 06 Jul 2025 08:00 )    Color: x / Appearance: x / SG: x / pH: x  Gluc: 110 mg/dL / Ketone: x  / Bili: x / Urobili: x   Blood: x / Protein: x / Nitrite: x   Leuk Esterase: x / RBC: x / WBC x   Sq Epi: x / Non Sq Epi: x / Bacteria: x        Urinalysis with Rflx Culture (collected 05 Jul 2025 17:55)    Culture - Wound Aerobic/Anaerobic (collected 05 Jul 2025 09:59)  Source: Swab Swab  Gram Stain (06 Jul 2025 00:56):    No polymorphonuclear leukocytes per low power field    No organisms seen per oil power field    Culture - Blood (collected 05 Jul 2025 08:25)  Source: Blood Blood-Peripheral  Preliminary Report (06 Jul 2025 13:01):    No growth at 24 hours    Culture - Blood (collected 05 Jul 2025 08:20)  Source: Blood Blood-Peripheral  Preliminary Report (06 Jul 2025 13:01):    No growth at 24 hours        RADIOLOGY & ADDITIONAL TESTS:    Imaging Personally Reviewed  Reviewed consultants input

## 2025-07-06 NOTE — PROGRESS NOTE ADULT - PROBLEM SELECTOR PLAN 1
Chart reviewed and Patient evaluated.  Discussed diagnosis and treatment with patient.  Concern for right 2nd digit infection with cellulitis to RLE.  Area of concern responding favorable to current treatment  Wounds evaluated and dressed with DSD.  Wound culture results reveal no growth  X-rays reviewed : Shows no subcutaneous emphysema or other acute fracture pathology noted. Osteolytic changes noted to right 2nd digit medial aspect of proximal and middle phalanges.   MRI results reviewed  Rec Continue with IV antibiotics and modify As Per ID recs.  Based on clinical evaluation, patient may require surgical debridement of all non-viable soft tissue and bone, with amputation of 2nd digit right foot tentatively planned for Tuesday 7/8,   Please document necessary medical/cardiac clearances.  PWB to right heel.   Podiatry will follow while in house.  Will discuss care plan with attending.

## 2025-07-07 DIAGNOSIS — M86.9 OSTEOMYELITIS, UNSPECIFIED: ICD-10-CM

## 2025-07-07 LAB
ANION GAP SERPL CALC-SCNC: 6 MMOL/L — SIGNIFICANT CHANGE UP (ref 5–17)
BUN SERPL-MCNC: 15 MG/DL — SIGNIFICANT CHANGE UP (ref 7–23)
CALCIUM SERPL-MCNC: 9.4 MG/DL — SIGNIFICANT CHANGE UP (ref 8.5–10.1)
CHLORIDE SERPL-SCNC: 106 MMOL/L — SIGNIFICANT CHANGE UP (ref 96–108)
CO2 SERPL-SCNC: 26 MMOL/L — SIGNIFICANT CHANGE UP (ref 22–31)
CREAT SERPL-MCNC: 1.2 MG/DL — SIGNIFICANT CHANGE UP (ref 0.5–1.3)
EGFR: 62 ML/MIN/1.73M2 — SIGNIFICANT CHANGE UP
EGFR: 62 ML/MIN/1.73M2 — SIGNIFICANT CHANGE UP
GLUCOSE SERPL-MCNC: 128 MG/DL — HIGH (ref 70–99)
HCT VFR BLD CALC: 43.8 % — SIGNIFICANT CHANGE UP (ref 39–50)
HGB BLD-MCNC: 14.1 G/DL — SIGNIFICANT CHANGE UP (ref 13–17)
MCHC RBC-ENTMCNC: 28.7 PG — SIGNIFICANT CHANGE UP (ref 27–34)
MCHC RBC-ENTMCNC: 32.2 G/DL — SIGNIFICANT CHANGE UP (ref 32–36)
MCV RBC AUTO: 89.2 FL — SIGNIFICANT CHANGE UP (ref 80–100)
NRBC # BLD AUTO: 0 K/UL — SIGNIFICANT CHANGE UP (ref 0–0)
NRBC # FLD: 0 K/UL — SIGNIFICANT CHANGE UP (ref 0–0)
NRBC BLD AUTO-RTO: 0 /100 WBCS — SIGNIFICANT CHANGE UP (ref 0–0)
PLATELET # BLD AUTO: 275 K/UL — SIGNIFICANT CHANGE UP (ref 150–400)
PMV BLD: 10.6 FL — SIGNIFICANT CHANGE UP (ref 7–13)
POTASSIUM SERPL-MCNC: 4.3 MMOL/L — SIGNIFICANT CHANGE UP (ref 3.5–5.3)
POTASSIUM SERPL-SCNC: 4.3 MMOL/L — SIGNIFICANT CHANGE UP (ref 3.5–5.3)
RBC # BLD: 4.91 M/UL — SIGNIFICANT CHANGE UP (ref 4.2–5.8)
RBC # FLD: 14.2 % — SIGNIFICANT CHANGE UP (ref 10.3–14.5)
SODIUM SERPL-SCNC: 138 MMOL/L — SIGNIFICANT CHANGE UP (ref 135–145)
WBC # BLD: 5.59 K/UL — SIGNIFICANT CHANGE UP (ref 3.8–10.5)
WBC # FLD AUTO: 5.59 K/UL — SIGNIFICANT CHANGE UP (ref 3.8–10.5)

## 2025-07-07 PROCEDURE — 87070 CULTURE OTHR SPECIMN AEROBIC: CPT

## 2025-07-07 PROCEDURE — 85027 COMPLETE CBC AUTOMATED: CPT

## 2025-07-07 PROCEDURE — 87641 MR-STAPH DNA AMP PROBE: CPT

## 2025-07-07 PROCEDURE — 93005 ELECTROCARDIOGRAM TRACING: CPT

## 2025-07-07 PROCEDURE — 85652 RBC SED RATE AUTOMATED: CPT

## 2025-07-07 PROCEDURE — 87640 STAPH A DNA AMP PROBE: CPT

## 2025-07-07 PROCEDURE — 87040 BLOOD CULTURE FOR BACTERIA: CPT

## 2025-07-07 PROCEDURE — 73630 X-RAY EXAM OF FOOT: CPT

## 2025-07-07 PROCEDURE — 36415 COLL VENOUS BLD VENIPUNCTURE: CPT

## 2025-07-07 PROCEDURE — 87077 CULTURE AEROBIC IDENTIFY: CPT

## 2025-07-07 PROCEDURE — 85730 THROMBOPLASTIN TIME PARTIAL: CPT

## 2025-07-07 PROCEDURE — 99232 SBSQ HOSP IP/OBS MODERATE 35: CPT

## 2025-07-07 PROCEDURE — 85025 COMPLETE CBC W/AUTO DIFF WBC: CPT

## 2025-07-07 PROCEDURE — 86901 BLOOD TYPING SEROLOGIC RH(D): CPT

## 2025-07-07 PROCEDURE — 80048 BASIC METABOLIC PNL TOTAL CA: CPT

## 2025-07-07 PROCEDURE — 86900 BLOOD TYPING SEROLOGIC ABO: CPT

## 2025-07-07 PROCEDURE — 81003 URINALYSIS AUTO W/O SCOPE: CPT

## 2025-07-07 PROCEDURE — 83605 ASSAY OF LACTIC ACID: CPT

## 2025-07-07 PROCEDURE — 73718 MRI LOWER EXTREMITY W/O DYE: CPT

## 2025-07-07 PROCEDURE — 71045 X-RAY EXAM CHEST 1 VIEW: CPT

## 2025-07-07 PROCEDURE — 80053 COMPREHEN METABOLIC PANEL: CPT

## 2025-07-07 PROCEDURE — 93970 EXTREMITY STUDY: CPT

## 2025-07-07 PROCEDURE — 85610 PROTHROMBIN TIME: CPT

## 2025-07-07 PROCEDURE — 86850 RBC ANTIBODY SCREEN: CPT

## 2025-07-07 RX ADMIN — CEFTRIAXONE 100 MILLIGRAM(S): 500 INJECTION, POWDER, FOR SOLUTION INTRAMUSCULAR; INTRAVENOUS at 17:25

## 2025-07-07 RX ADMIN — DULOXETINE 60 MILLIGRAM(S): 20 CAPSULE, DELAYED RELEASE ORAL at 11:13

## 2025-07-07 RX ADMIN — TAMSULOSIN HYDROCHLORIDE 0.4 MILLIGRAM(S): 0.4 CAPSULE ORAL at 21:24

## 2025-07-07 RX ADMIN — PREDNISONE 2.5 MILLIGRAM(S): 20 TABLET ORAL at 06:33

## 2025-07-07 RX ADMIN — ENOXAPARIN SODIUM 40 MILLIGRAM(S): 100 INJECTION SUBCUTANEOUS at 06:33

## 2025-07-07 RX ADMIN — ATORVASTATIN CALCIUM 10 MILLIGRAM(S): 80 TABLET, FILM COATED ORAL at 21:24

## 2025-07-07 RX ADMIN — Medication 40 MILLIGRAM(S): at 06:33

## 2025-07-07 RX ADMIN — CALCIUM CARBONATE 1 TABLET(S): 750 TABLET ORAL at 11:13

## 2025-07-07 NOTE — PROGRESS NOTE ADULT - SUBJECTIVE AND OBJECTIVE BOX
PROGRESS NOTE   Patient is a 79y old  Male who presents with a chief complaint of wound right 2nd toe    HPI:  79m with PMR, CAD, GERD, HTN, HLD p/w foot/leg swelling.  Approximately 2 days ago he reports sustaining a cut to the toe #2 on his right foot..  He now has swelling to his right leg with foul-smelling discharge from his foot.  He denies any prior foot trauma.  He is not diabetic.  But he is on steroids.  Denies any cough fever chills.  He denies any cardiac respiratory symptoms.   (05 Jul 2025 15:17)      Vital Signs Last 24 Hrs  T(C): 36.6 (07 Jul 2025 04:35), Max: 36.8 (06 Jul 2025 11:47)  T(F): 97.8 (07 Jul 2025 04:35), Max: 98.3 (06 Jul 2025 20:52)  HR: 71 (07 Jul 2025 04:35) (69 - 71)  BP: 103/65 (07 Jul 2025 04:35) (103/65 - 136/70)  BP(mean): --  RR: 18 (07 Jul 2025 04:35) (18 - 18)  SpO2: 95% (07 Jul 2025 04:35) (95% - 95%)    Parameters below as of 07 Jul 2025 04:35  Patient On (Oxygen Delivery Method): room air                              13.6   6.71  )-----------( 244      ( 06 Jul 2025 08:00 )             40.5               07-06    138  |  108  |  18  ----------------------------<  110[H]  4.0   |  22  |  1.20    Ca    8.7      06 Jul 2025 08:00    TPro  8.3  /  Alb  3.3  /  TBili  1.1  /  DBili  x   /  AST  14[L]  /  ALT  14  /  AlkPhos  99  07-05      PHYSICAL EXAM    The patient is a pleasant well-nourished, well developed 79y Male in no acute distress, alert awake, and oriented to person, place and time.     Vasc: DP and PT pulses 2/4 RLE.                         Derm: nails 1-5 are non dystrophic B/L. web spaces are dry and clean. wound noted medial aspect of right 2nd toe, mixed granulated  , fibrotic, about 1.5cmx1.5cmx0.5 cm, positive for edema, negative for tracking, negative probe to bone,  with creamy bloody stain on dressing. no erythema, or ecchymosis,                         Neuro: protective sensation diminshed                         MSK: muscles power 5/5 to all muscle groups.  MRI right foot  IMPRESSION:  1. Soft tissue wound in the medial aspect of second toe extending to the second PIP joint with associated septic arthritis.   PROGRESS NOTE   Patient is a 79y old  Male who presents with a chief complaint of wound right 2nd toe    HPI:  79m with PMR, CAD, GERD, HTN, HLD p/w foot/leg swelling.  Approximately 2 days ago he reports sustaining a cut to the toe #2 on his right foot..  He now has swelling to his right leg with foul-smelling discharge from his foot.  He denies any prior foot trauma.  He is not diabetic.  But he is on steroids.  Denies any cough fever chills.  He denies any cardiac respiratory symptoms.   (05 Jul 2025 15:17)      Vital Signs Last 24 Hrs  T(C): 36.6 (07 Jul 2025 04:35), Max: 36.8 (06 Jul 2025 11:47)  T(F): 97.8 (07 Jul 2025 04:35), Max: 98.3 (06 Jul 2025 20:52)  HR: 71 (07 Jul 2025 04:35) (69 - 71)  BP: 103/65 (07 Jul 2025 04:35) (103/65 - 136/70)  BP(mean): --  RR: 18 (07 Jul 2025 04:35) (18 - 18)  SpO2: 95% (07 Jul 2025 04:35) (95% - 95%)    Parameters below as of 07 Jul 2025 04:35  Patient On (Oxygen Delivery Method): room air                              13.6   6.71  )-----------( 244      ( 06 Jul 2025 08:00 )             40.5               07-06    138  |  108  |  18  ----------------------------<  110[H]  4.0   |  22  |  1.20    Ca    8.7      06 Jul 2025 08:00    TPro  8.3  /  Alb  3.3  /  TBili  1.1  /  DBili  x   /  AST  14[L]  /  ALT  14  /  AlkPhos  99  07-05      PHYSICAL EXAM    The patient is a pleasant well-nourished, well developed 79y Male in no acute distress, alert awake, and oriented to person, place and time.     Vasc: DP and PT pulses 2/4 RLE.                         Derm: nails 1-5 are non dystrophic B/L. web spaces are dry and clean. wound noted medial aspect of right 2nd toe, mixed granulated  , fibrotic, about 1.5cmx1.5cmx0.5 cm, positive for edema,  positive  for tracking,  positive  probe to bone,  with creamy bloody stain on dressing. no erythema, or ecchymosis,                         Neuro: protective sensation diminished                         MSK: muscles power 5/5 to all muscle groups.  MRI right foot  IMPRESSION:  1. Soft tissue wound in the medial aspect of second toe extending to the second PIP joint with associated septic arthritis.

## 2025-07-07 NOTE — PROGRESS NOTE ADULT - SUBJECTIVE AND OBJECTIVE BOX
[facility]  Collin Engle MD PhD, Kusum Barrera MD, Shobha Magdaleno MD, Todd Brown MD, Alfa Sanchez MD  and providing coverage with Claudia Varela MD  Providing Infectious Disease Consultations at [facility], [facility], [facility], [facility]'s    Office# [phone] to schedule follow up appointments  Answering Service for urgent calls or New Consults [phone]  Cell# to text for urgent issues Collin Engle [phone]     infectious diseases progress note:    MATT SCHNEIDER is a 79y y. o. Male patient    Overnight and events of the last 24hrs reviewed    Allergies    No Known Allergies    Intolerances        ANTIBIOTICS/RELEVANT:  antimicrobials  cefTRIAXone   IVPB 2000 milliGRAM(s) IV Intermittent every 24 hours    immunologic:    OTHER:  acetaminophen     Tablet .. 650 milliGRAM(s) Oral every 6 hours PRN  aluminum hydroxide/magnesium hydroxide/simethicone Suspension 30 milliLiter(s) Oral every 4 hours PRN  atorvastatin 10 milliGRAM(s) Oral at bedtime  calcium carbonate   1250 mG (OsCal) 1 Tablet(s) Oral daily  DULoxetine 60 milliGRAM(s) Oral daily  enoxaparin Injectable 40 milliGRAM(s) SubCutaneous every 24 hours  losartan 25 milliGRAM(s) Oral daily  melatonin 3 milliGRAM(s) Oral at bedtime PRN  ondansetron Injectable 4 milliGRAM(s) IV Push every 8 hours PRN  pantoprazole    Tablet 40 milliGRAM(s) Oral before breakfast  predniSONE   Tablet 2.5 milliGRAM(s) Oral daily  tamsulosin 0.4 milliGRAM(s) Oral at bedtime      Objective:  Vital Signs Last 24 Hrs  T(C): 37.1 (07 Jul 2025 11:08), Max: 37.1 (07 Jul 2025 11:08)  T(F): 98.8 (07 Jul 2025 11:08), Max: 98.8 (07 Jul 2025 11:08)  HR: 88 (07 Jul 2025 11:08) (69 - 88)  BP: 129/80 (07 Jul 2025 11:08) (103/65 - 136/70)  BP(mean): --  RR: 18 (07 Jul 2025 11:08) (18 - 18)  SpO2: 91% (07 Jul 2025 11:08) (91% - 95%)    Parameters below as of 07 Jul 2025 11:08  Patient On (Oxygen Delivery Method): room air        T(C): 37.1 (07-07-25 @ 11:08), Max: 37.1 (07-07-25 @ 11:08)  T(C): 37.1 (07-07-25 @ 11:08), Max: 37.1 (07-07-25 @ 11:08)  T(C): 37.1 (07-07-25 @ 11:08), Max: 37.1 (07-07-25 @ 11:08)    PHYSICAL EXAM:  HEENT: NC atraumatic  Neck: supple  Respiratory: no accessory muscle use, breathing comfortably  Cardiovascular: distant  Gastrointestinal: normal appearing, nondistended  Extremities: no clubbing, no cyanosis,        LABS:                          14.1   5.59  )-----------( 275      ( 07 Jul 2025 09:06 )             43.8       WBC  5.59 07-07 @ 09:06  6.71 07-06 @ 08:00  6.27 07-05 @ 08:25      07-07    138  |  106  |  15  ----------------------------<  128[H]  4.3   |  26  |  1.20    Ca    9.4      07 Jul 2025 09:06        Creatinine: 1.20 mg/dL (07-07-25 @ 09:06)  Creatinine: 1.20 mg/dL (07-06-25 @ 08:00)  Creatinine: 1.30 mg/dL (07-05-25 @ 08:25)        Urinalysis Basic - ( 07 Jul 2025 09:06 )    Color: x / Appearance: x / SG: x / pH: x  Gluc: 128 mg/dL / Ketone: x  / Bili: x / Urobili: x   Blood: x / Protein: x / Nitrite: x   Leuk Esterase: x / RBC: x / WBC x   Sq Epi: x / Non Sq Epi: x / Bacteria: x            INFLAMMATORY MARKERS      MICROBIOLOGY:              RADIOLOGY & ADDITIONAL STUDIES:

## 2025-07-07 NOTE — PROGRESS NOTE ADULT - PROBLEM SELECTOR PLAN 1
Problem: Acute osteomyelitis.     Plan: Chart reviewed and Patient evaluated.  Discussed diagnosis and treatment with patient.  Concern for right 2nd digit infection.  Wounds evaluated and dressed with DSD.  Wound culture results reveal no growth  X-rays reviewed   MRI results reviewed  Rec Continue with IV antibiotics As Per ID recs.  Based on clinical evaluation, patient may require surgical debridement of all non-viable soft tissue and bone planned for Tuesday 7/8,   Podiatry will follow while in house.  Will discuss care plan with attending. The documentation recorded by the scribe accurately and completely reflects the service(s) I personally performed and the decisions made by me.     TIME SPENT IN REVIEWING RECORDS, LABS, IMAGING, CONFERRING WITH TEAM MEMBERS,  AND DISCUSSION WITH PATIENT APPROX 30 MIN    Chief complaint:   Chief Complaint   Patient presents with   • Follow-up     ER follow-up for a fib       Vitals:  Visit Vitals  /76 (BP Location: RUE - Right upper extremity, Patient Position: Sitting, Cuff Size: Regular)   Pulse (!) 54   Resp 16   Ht 6' 3\" (1.905 m)   Wt 122 kg (269 lb)   SpO2 98%   BMI 33.62 kg/m²       HISTORY OF PRESENT ILLNESS   Hospital Follow up - Atrial Flutter    [facility] follow up for new onset atrial fibrillation post DCCV 7/26/23 managed with Amiodarone 200 mg daily, Metoprolol 25 mg daily and anticoagulated on Eliquis for a CHADSVASC score of 3 (CHF, AGE, VD.)  He was seen by his PCP for shortness of breath and chest pain with exertion and found to have an irregular heart rated, AF with RVR.  S/P TAVR 7/24/23 with Dr. Tran and Dr. Elder for severe aortic stenosis.  8/2/23 had ED visit for bleeding from mouth, held Eliquis for 4 days.  Patient was discharged with Life Vest but is not wearing due to it being uncomfortable.      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  Patient states he overall feels well. Denies syncope, lightheadedness, dizziness, or palpitations. He denies new or different cough, edema, SOB or chest pain.    Other significant problems:  Patient Active Problem List    Diagnosis Date Noted   • Atrial fibrillation (CMD) 07/18/2023     Priority: Low   • Atrial flutter (CMD) 07/17/2023     Priority: Low   • CHF (congestive heart failure), NYHA class I, acute, combined (CMD) 07/17/2023     Priority: Low   • Encounter for  central line care 01/15/2020     Priority: Low   • Chemotherapy induced neutropenia (CMD) 01/03/2020     Priority: Low   • Diffuse large B-cell lymphoma of lymph nodes of head (CMD) 11/11/2019     Priority: Low       PAST MEDICAL, FAMILY AND SOCIAL HISTORY     Medications:  Current Outpatient Medications   Medication Sig Dispense Refill   • AMIODarone (PACERONE) 200 MG tablet Take 1 tablet by mouth in the morning and 1 tablet in the evening. 60 tablet 0   • aspirin 81 MG chewable tablet Chew 1 tablet by mouth daily. Do not start before July 30, 2023. 30 tablet 0   • atorvastatin (LIPITOR) 80 MG tablet Take 1 tablet by mouth nightly. 30 tablet 0   • metoPROLOL succinate (TOPROL-XL) 25 MG 24 hr tablet Take one-HALF tablet by mouth daily. Do not start before July 30, 2023. 30 tablet 0   • apixaBAN (ELIQUIS) 5 MG Tab Take 1 tablet by mouth every 12 hours. 60 tablet 2   • loratadine (CLARITIN) 10 MG tablet Take 1 tablet by mouth 12 hours prior to CT scans on 4/4/2022 1 tablet 0   • acetaminophen (Tylenol) 325 MG tablet Take 2 tablets by mouth every 6 hours as needed for Pain.     • LORazepam (ATIVAN) 1 MG tablet Pt to take medication with him to the lumbar puncture procedure.  He will be told to take one pill at procedure by procedure nurse 6 tablet 0     No current facility-administered medications for this visit.       Allergies:  ALLERGIES:   Allergen Reactions   • Betadine [Povidone Iodine] RASH   • Chlorhexidine RASH   • Ciprofloxacin RASH   • Clindamycin RASH   • Dulcolax RASH   • Ibuprofen Other (See Comments)     Blisters on hands    • Neosporin [Neomycin-Bacitracin-Polymyxin] RASH   • Penicillins RASH     Tolerated cefazolin (7/25/2023)   • Povidone-Iodine RASH   • Sulfa Antibiotics RASH       Past Medical  History/Surgeries:  Past Medical History:   Diagnosis Date   • Arthritis    • Diabetes (CMD)    • Diffuse large B-cell lymphoma of lymph nodes of head (CMD) 11/11/2019   • DJD (degenerative joint disease)     • Essential (primary) hypertension    • Glaucoma    • Hyperlipidemia    • Obesity        Past Surgical History:   Procedure Laterality Date   • Bone biopsy  11/08/2019   • Eye surgery Right 10/15/2014    cataract extraction w/ IOL    • Eye surgery Left 12/14/2017    cataract extraction w/ IOL    • Fracture surgery Left     tibial plateau fx   • Hernia repair     • Insertion of access port  11/14/2019    Wero Armstrong   • Removal of access port  10/26/2021   • Soft tissue biopsy  10/14/2019    soft tissue right maxilla   • Tonsillectomy and adenoidectomy         Family History:  History reviewed. No pertinent family history.    Social History:  Social History     Tobacco Use   • Smoking status: Never   • Smokeless tobacco: Current     Types: Chew   Substance Use Topics   • Alcohol use: Yes     Comment: socially       REVIEW OF SYSTEMS     Review of Systems   Constitutional: Negative for fatigue.   Respiratory: Negative for chest tightness and shortness of breath.    Cardiovascular: Negative for chest pain, palpitations and leg swelling.   Neurological: Negative for dizziness and syncope.   All other systems reviewed and are negative.      PHYSICAL EXAM     Physical Exam       The patient is awake alert oriented and in no apparent distress. Appears well developed, oriented x3.     VITAL SIGNS: Vital signs are obtained and are documented in electronic medical records.     HEENT  Examination of the head and neck reveals no jugular venous distention. Pupils are equal and reactive.  Thyroid is normal.  Ears normal.   No obvious cervical lymphadenopathy.  Oropharynx normal.    HEART:  The apex beat is palpable in the 5th interspace, anterior axillary line. Normal in character. The first heart sound is normal in intensity. The second heart sound is normally split. P2 is loud. Systolic murmurs heard at A2 and P2 areas.  S4 is present. No rubs auscultated.    LUNGS:  Lungs are clear to auscultation and percussion. No rales  are heard. The breath sounds are equal bilaterally and equal expansion is noted. Breath sounds are decreased overall.    ABDOMEN:  The abdomen is essentially benign. There is no hepatomegaly or tenderness in any part of the abdomen. Bowel sounds are present. No masses are felt. No ascites noted. Spleen is not palpable.     EXTREMITIES:  The extremities appear essentially normal. Pulses are equal but reduced.There is no peripheral edema.    CENTRAL NERVOUS SYSTEM:  Alert and oriented X3.  Cranial nerves 2-12 are intact. Reflexes appear normal. There is equal strength on both sides in both upper and lower limbs.    SKIN, BONES/JOINT:  No significant abnormality detected. Skin color is normal, warm and dry.           TSH (mcUnits/mL)   Date Value   07/24/2023 0.539     WBC (K/mcL)   Date Value   08/02/2023 5.1     RBC (mil/mcL)   Date Value   08/02/2023 3.36 (L)     HCT (%)   Date Value   08/02/2023 32.3 (L)     HGB (g/dL)   Date Value   08/02/2023 11.4 (L)     PLT (K/mcL)   Date Value   08/02/2023 188     Sodium (mmol/L)   Date Value   07/31/2023 137     Potassium (mmol/L)   Date Value   07/31/2023 4.2     Chloride (mmol/L)   Date Value   07/31/2023 101     Glucose (mg/dL)   Date Value   07/31/2023 131 (H)     Calcium (mg/dL)   Date Value   07/31/2023 9.3     Carbon Dioxide (mmol/L)   Date Value   07/31/2023 28     BUN (mg/dL)   Date Value   07/31/2023 26 (H)     Creatinine (mg/dL)   Date Value   07/31/2023 1.24 (H)     Ejection Fraction   Date Value Ref Range Status   07/28/2023 34 % Final   Limited    Echo - 7/17/2023  Severely decreased left ventricular systolic function.  Left ventricular ejection fraction; 29 %.  Left ventricular regional wall motion abnormalities (see diagram).  Definity contrast aided in the interpretation of left ventricular wall motion.  Severe aortic valve calcification.  Severe aortic valve stenosis; mean gradient 43 mmHg, SAM 0.6 cm2.  Dilated ascending aorta measuring 4.20  cm.  Significant decrease in LV function with segmental wall motion defects compared to prior echo 2020.  LA Volume Index (BP A-L) 40.2 ml/m²    TAVR - 7/24/2023    Right Heart Cath - 7/19/2023  Findings  -R radial access for LHC and R femoral access for RHC  -R dominant coronary artery system  -RCA has minimal luminal irregularities  -L main has minimal luminal irregularities  -LAD has minimal luminal irregularities   -LCx has minimal luminal irregularities  -Pulmonary hypertension with mean PA pressure 36mmHg      DCCV - 7/26/2023  Rhythm was confirmed to be atrial fibrillation. MAC IV sedation was administered by a member of the anaesthesiology staff. ARLEN was performed to rule out intra-cardiac thrombus (refer to ARLEN report for further details).  Shocks delivered:  Shock 1:  Energy: 200J. Synchronized. Result: NSR. Complications: None.      I have personally reviewed the EKG and it reveals: Sinus Bradycardia        ASSESSMENT/PLAN     Impression:  Non-Ischemic Cardiomyopathy  Atrial Fibrillation  Anticoagulation -Eliquis  High Risk Medication - amiodarone    Discussion:   The patient remains in sinus rhythm after cardioversion and loading with amiodarone.  He had had successful percutaneous aortic valve replacement and feels better since that time.  He was prescribed a LifeVest because of an ejection fraction of 34% after the TAVR.  He refused to wear the LifeVest.  He is now here for a follow-up visit and feels fairly well    Dr. Tran has scheduled echocardiogram on August 25th.  This will help us decide further treatment.  In the interim we will continue amiodarone.  If LV function remains depressed after 3 months of maximum tolerated heart failure medications, an ICD will be indicated for primary prevention of cardiac arrest.  We will monitor for atrial fibrillation and will monitor for amiodarone toxicity in the interim    Plan:  Have Echo scheduled 8/25  Follow up after if EF not improved for possible ICD  implantation    On   8/8/2023, IJulissa RN scribed the services personally performed by Dr. Sami Gamboa.       Problem: Acute osteomyelitis.     Plan: Chart reviewed and Patient evaluated.  Discussed diagnosis and treatment with patient.  Concern for right 2nd digit infection with OM .  Wounds evaluated and dressed with DSD.  Wound culture results reveal no growth  X-rays reviewed   MRI results reviewed  Rec Continue with IV antibiotics As Per ID recs.  Based on clinical evaluation, patient may require surgical debridement of all non-viable soft tissue and bone planned for Tuesday 7/8,   Podiatry will follow while in house.  Will discuss care plan with attending.

## 2025-07-07 NOTE — PROGRESS NOTE ADULT - SUBJECTIVE AND OBJECTIVE BOX
Patient is a 79y old  Male who presents with a chief complaint of Right 2nd toe infection  RLE cellulitis (07 Jul 2025 07:50)        INTERVAL HPI/OVERNIGHT EVENTS:   no complaints  pt seen and examined         Vital Signs Last 24 Hrs  T(C): 37.1 (07 Jul 2025 11:08), Max: 37.1 (07 Jul 2025 11:08)  T(F): 98.8 (07 Jul 2025 11:08), Max: 98.8 (07 Jul 2025 11:08)  HR: 88 (07 Jul 2025 11:08) (69 - 88)  BP: 129/80 (07 Jul 2025 11:08) (103/65 - 136/70)  BP(mean): --  RR: 18 (07 Jul 2025 11:08) (18 - 18)  SpO2: 91% (07 Jul 2025 11:08) (91% - 95%)    Parameters below as of 07 Jul 2025 11:08  Patient On (Oxygen Delivery Method): room air        acetaminophen     Tablet .. 650 milliGRAM(s) Oral every 6 hours PRN  aluminum hydroxide/magnesium hydroxide/simethicone Suspension 30 milliLiter(s) Oral every 4 hours PRN  atorvastatin 10 milliGRAM(s) Oral at bedtime  calcium carbonate   1250 mG (OsCal) 1 Tablet(s) Oral daily  cefTRIAXone   IVPB 2000 milliGRAM(s) IV Intermittent every 24 hours  DULoxetine 60 milliGRAM(s) Oral daily  enoxaparin Injectable 40 milliGRAM(s) SubCutaneous every 24 hours  losartan 25 milliGRAM(s) Oral daily  melatonin 3 milliGRAM(s) Oral at bedtime PRN  ondansetron Injectable 4 milliGRAM(s) IV Push every 8 hours PRN  pantoprazole    Tablet 40 milliGRAM(s) Oral before breakfast  predniSONE   Tablet 2.5 milliGRAM(s) Oral daily  tamsulosin 0.4 milliGRAM(s) Oral at bedtime      PHYSICAL EXAM:  GENERAL: NAD   EYES: conjunctiva and sclera clear  ENMT: Moist mucous membranes  NECK: Supple, No JVD, Normal thyroid  CHEST/LUNG: non labored, cta b/l  HEART: Regular rate and rhythm; No murmurs, rubs, or gallops  ABDOMEN: Soft, Nontender, Nondistended; Bowel sounds present  EXTREMITIES:  No clubbing, no cyanosis, no edema  LYMPH: No lymphadenopathy noted  SKIN: No rashes or lesions  NEURO: no new focal deficits    Consultant(s) Notes Reviewed:  [x ] YES  [ ] NO  Care Discussed with Consultants/Other Providers [ x] YES  [ ] NO    LABS:                        14.1   5.59  )-----------( 275      ( 07 Jul 2025 09:06 )             43.8     07-07    138  |  106  |  15  ----------------------------<  128[H]  4.3   |  26  |  1.20    Ca    9.4      07 Jul 2025 09:06        Urinalysis Basic - ( 07 Jul 2025 09:06 )    Color: x / Appearance: x / SG: x / pH: x  Gluc: 128 mg/dL / Ketone: x  / Bili: x / Urobili: x   Blood: x / Protein: x / Nitrite: x   Leuk Esterase: x / RBC: x / WBC x   Sq Epi: x / Non Sq Epi: x / Bacteria: x      CAPILLARY BLOOD GLUCOSE            Urinalysis Basic - ( 07 Jul 2025 09:06 )    Color: x / Appearance: x / SG: x / pH: x  Gluc: 128 mg/dL / Ketone: x  / Bili: x / Urobili: x   Blood: x / Protein: x / Nitrite: x   Leuk Esterase: x / RBC: x / WBC x   Sq Epi: x / Non Sq Epi: x / Bacteria: x        Urinalysis with Rflx Culture (collected 05 Jul 2025 17:55)    Culture - Wound Aerobic/Anaerobic (collected 05 Jul 2025 09:59)  Source: Swab Swab  Gram Stain (06 Jul 2025 19:57):    No polymorphonuclear leukocytes per low power field    No organisms seen per oil power field  Preliminary Report (06 Jul 2025 19:57):    Numerous Streptococcus pyogenes (Group A)    Penicillin and ampicillin are drugs of choice for    treatment of beta-hemolytic streptococcal infections.    Susceptibility testing is not performed routinely because    S. pyogenes (GAS) is universally susceptible to penicillin    and resistance in other strains is extremely rare.    Culture - Blood (collected 05 Jul 2025 08:25)  Source: Blood Blood-Peripheral  Preliminary Report (07 Jul 2025 13:02):    No growth at 48 Hours    Culture - Blood (collected 05 Jul 2025 08:20)  Source: Blood Blood-Peripheral  Preliminary Report (07 Jul 2025 13:02):    No growth at 48 Hours        RADIOLOGY & ADDITIONAL TESTS:    Imaging Personally Reviewed  Reviewed consultants input

## 2025-07-07 NOTE — PROGRESS NOTE ADULT - SUBJECTIVE AND OBJECTIVE BOX
[facility]    Jac Cunningham, Quoc, Kevin, Mina, Mati      [phone]    CHIEF COMPLAINT: Patient is a 79y old  Male who presents with a chief complaint of Right 2nd toe infection  RLE cellulitis (07 Jul 2025 07:50)      Follow Up: htn, cad, osteo    Interim history: The patient reports no new symptoms.  Denies chest discomfort and shortness of breath.  No abdominal pain.  No new neurologic symptoms.      MEDICATIONS  (STANDING):  atorvastatin 10 milliGRAM(s) Oral at bedtime  calcium carbonate   1250 mG (OsCal) 1 Tablet(s) Oral daily  cefTRIAXone   IVPB 2000 milliGRAM(s) IV Intermittent every 24 hours  DULoxetine 60 milliGRAM(s) Oral daily  enoxaparin Injectable 40 milliGRAM(s) SubCutaneous every 24 hours  losartan 25 milliGRAM(s) Oral daily  pantoprazole    Tablet 40 milliGRAM(s) Oral before breakfast  predniSONE   Tablet 2.5 milliGRAM(s) Oral daily  tamsulosin 0.4 milliGRAM(s) Oral at bedtime    MEDICATIONS  (PRN):  acetaminophen     Tablet .. 650 milliGRAM(s) Oral every 6 hours PRN Temp greater or equal to 38C (100.4F), Mild Pain (1 - 3)  aluminum hydroxide/magnesium hydroxide/simethicone Suspension 30 milliLiter(s) Oral every 4 hours PRN Dyspepsia  melatonin 3 milliGRAM(s) Oral at bedtime PRN Insomnia  ondansetron Injectable 4 milliGRAM(s) IV Push every 8 hours PRN Nausea and/or Vomiting      REVIEW OF SYSTEMS:  eye, ent, GI, , allergic, dermatologic, musculoskeletal and neurologic are negative except as described above    Vital Signs Last 24 Hrs  T(C): 36.6 (07 Jul 2025 04:35), Max: 36.8 (06 Jul 2025 11:47)  T(F): 97.8 (07 Jul 2025 04:35), Max: 98.3 (06 Jul 2025 20:52)  HR: 71 (07 Jul 2025 04:35) (69 - 71)  BP: 103/65 (07 Jul 2025 04:35) (103/65 - 136/70)  BP(mean): --  RR: 18 (07 Jul 2025 04:35) (18 - 18)  SpO2: 95% (07 Jul 2025 04:35) (95% - 95%)    Parameters below as of 07 Jul 2025 04:35  Patient On (Oxygen Delivery Method): room air        I&O's Summary      Telemetry past 24h:    PHYSICAL EXAM:    Constitutional: well-nourished, well-developed, NAD   HEENT:  MMM, sclerae anicteric, conjunctivae clear, no oral cyanosis.  Pulmonary: Non-labored, breath sounds are clear bilaterally, No wheezing, rales or rhonchi  Cardiovascular: Regular, S1 and S2.  No murmur.  No rubs, gallops or clicks  Gastrointestinal: Bowel Sounds present, soft, nontender.   Lymph: right foot peripheral edema.   Neurological: Alert, no focal deficits  Skin: No rashes.  Psych:  Mood & affect appropriate    LABS: All Labs Reviewed:                        13.6   6.71  )-----------( 244      ( 06 Jul 2025 08:00 )             40.5                         15.1   6.27  )-----------( 253      ( 05 Jul 2025 08:25 )             45.4     06 Jul 2025 08:00    138    |  108    |  18     ----------------------------<  110    4.0     |  22     |  1.20   05 Jul 2025 08:25    135    |  105    |  22     ----------------------------<  113    3.8     |  24     |  1.30     Ca    8.7        06 Jul 2025 08:00  Ca    9.3        05 Jul 2025 08:25    TPro  8.3    /  Alb  3.3    /  TBili  1.1    /  DBili  x      /  AST  14     /  ALT  14     /  AlkPhos  99     05 Jul 2025 08:25          Blood Culture: Organism --  Gram Stain Blood -- Gram Stain   No polymorphonuclear leukocytes per low power field  No organisms seen per oil power field  Specimen Source Swab Swab  Culture-Blood --    Organism --  Gram Stain Blood -- Gram Stain --  Specimen Source Blood Blood-Peripheral  Culture-Blood --    Organism --  Gram Stain Blood -- Gram Stain --  Specimen Source Blood Blood-Peripheral  Culture-Blood --           RADIOLOGY:    EKG:    Echo:

## 2025-07-08 LAB
-  CLINDAMYCIN: SIGNIFICANT CHANGE UP
-  ERYTHROMYCIN: SIGNIFICANT CHANGE UP
-  GENTAMICIN: SIGNIFICANT CHANGE UP
-  OXACILLIN: SIGNIFICANT CHANGE UP
-  PENICILLIN: SIGNIFICANT CHANGE UP
-  RIFAMPIN: SIGNIFICANT CHANGE UP
-  TETRACYCLINE: SIGNIFICANT CHANGE UP
-  TRIMETHOPRIM/SULFAMETHOXAZOLE: SIGNIFICANT CHANGE UP
-  VANCOMYCIN: SIGNIFICANT CHANGE UP
A1C WITH ESTIMATED AVERAGE GLUCOSE RESULT: 6.4 % — HIGH (ref 4–5.6)
ABO RH CONFIRMATION: SIGNIFICANT CHANGE UP
ANION GAP SERPL CALC-SCNC: 4 MMOL/L — LOW (ref 5–17)
BUN SERPL-MCNC: 15 MG/DL — SIGNIFICANT CHANGE UP (ref 7–23)
CALCIUM SERPL-MCNC: 9 MG/DL — SIGNIFICANT CHANGE UP (ref 8.5–10.1)
CHLORIDE SERPL-SCNC: 108 MMOL/L — SIGNIFICANT CHANGE UP (ref 96–108)
CO2 SERPL-SCNC: 26 MMOL/L — SIGNIFICANT CHANGE UP (ref 22–31)
CREAT SERPL-MCNC: 1.1 MG/DL — SIGNIFICANT CHANGE UP (ref 0.5–1.3)
EGFR: 69 ML/MIN/1.73M2 — SIGNIFICANT CHANGE UP
EGFR: 69 ML/MIN/1.73M2 — SIGNIFICANT CHANGE UP
ESTIMATED AVERAGE GLUCOSE: 137 MG/DL — HIGH (ref 68–114)
GLUCOSE SERPL-MCNC: 114 MG/DL — HIGH (ref 70–99)
HCT VFR BLD CALC: 42.9 % — SIGNIFICANT CHANGE UP (ref 39–50)
HGB BLD-MCNC: 13.8 G/DL — SIGNIFICANT CHANGE UP (ref 13–17)
MCHC RBC-ENTMCNC: 28.5 PG — SIGNIFICANT CHANGE UP (ref 27–34)
MCHC RBC-ENTMCNC: 32.2 G/DL — SIGNIFICANT CHANGE UP (ref 32–36)
MCV RBC AUTO: 88.5 FL — SIGNIFICANT CHANGE UP (ref 80–100)
METHOD TYPE: SIGNIFICANT CHANGE UP
NRBC # BLD AUTO: 0 K/UL — SIGNIFICANT CHANGE UP (ref 0–0)
NRBC # FLD: 0 K/UL — SIGNIFICANT CHANGE UP (ref 0–0)
NRBC BLD AUTO-RTO: 0 /100 WBCS — SIGNIFICANT CHANGE UP (ref 0–0)
PLATELET # BLD AUTO: 279 K/UL — SIGNIFICANT CHANGE UP (ref 150–400)
PMV BLD: 10.2 FL — SIGNIFICANT CHANGE UP (ref 7–13)
POTASSIUM SERPL-MCNC: 4.2 MMOL/L — SIGNIFICANT CHANGE UP (ref 3.5–5.3)
POTASSIUM SERPL-SCNC: 4.2 MMOL/L — SIGNIFICANT CHANGE UP (ref 3.5–5.3)
RBC # BLD: 4.85 M/UL — SIGNIFICANT CHANGE UP (ref 4.2–5.8)
RBC # FLD: 13.9 % — SIGNIFICANT CHANGE UP (ref 10.3–14.5)
SODIUM SERPL-SCNC: 138 MMOL/L — SIGNIFICANT CHANGE UP (ref 135–145)
WBC # BLD: 5.87 K/UL — SIGNIFICANT CHANGE UP (ref 3.8–10.5)
WBC # FLD AUTO: 5.87 K/UL — SIGNIFICANT CHANGE UP (ref 3.8–10.5)

## 2025-07-08 PROCEDURE — 99232 SBSQ HOSP IP/OBS MODERATE 35: CPT

## 2025-07-08 PROCEDURE — 73630 X-RAY EXAM OF FOOT: CPT | Mod: 26,RT

## 2025-07-08 PROCEDURE — 88304 TISSUE EXAM BY PATHOLOGIST: CPT | Mod: 26

## 2025-07-08 PROCEDURE — 88311 DECALCIFY TISSUE: CPT | Mod: 26

## 2025-07-08 RX ORDER — ATORVASTATIN CALCIUM 80 MG/1
10 TABLET, FILM COATED ORAL AT BEDTIME
Refills: 0 | Status: DISCONTINUED | OUTPATIENT
Start: 2025-07-08 | End: 2025-07-11

## 2025-07-08 RX ORDER — ENOXAPARIN SODIUM 100 MG/ML
40 INJECTION SUBCUTANEOUS EVERY 24 HOURS
Refills: 0 | Status: DISCONTINUED | OUTPATIENT
Start: 2025-07-09 | End: 2025-07-11

## 2025-07-08 RX ORDER — SODIUM CHLORIDE 9 G/1000ML
1000 INJECTION, SOLUTION INTRAVENOUS
Refills: 0 | Status: DISCONTINUED | OUTPATIENT
Start: 2025-07-08 | End: 2025-07-08

## 2025-07-08 RX ORDER — ONDANSETRON HCL/PF 4 MG/2 ML
4 VIAL (ML) INJECTION ONCE
Refills: 0 | Status: DISCONTINUED | OUTPATIENT
Start: 2025-07-08 | End: 2025-07-08

## 2025-07-08 RX ORDER — ACETAMINOPHEN 500 MG/5ML
650 LIQUID (ML) ORAL EVERY 6 HOURS
Refills: 0 | Status: DISCONTINUED | OUTPATIENT
Start: 2025-07-08 | End: 2025-07-11

## 2025-07-08 RX ORDER — MELATONIN 5 MG
3 TABLET ORAL AT BEDTIME
Refills: 0 | Status: DISCONTINUED | OUTPATIENT
Start: 2025-07-08 | End: 2025-07-11

## 2025-07-08 RX ORDER — TAMSULOSIN HYDROCHLORIDE 0.4 MG/1
0.4 CAPSULE ORAL AT BEDTIME
Refills: 0 | Status: DISCONTINUED | OUTPATIENT
Start: 2025-07-08 | End: 2025-07-11

## 2025-07-08 RX ORDER — PREDNISONE 20 MG/1
2.5 TABLET ORAL DAILY
Refills: 0 | Status: DISCONTINUED | OUTPATIENT
Start: 2025-07-08 | End: 2025-07-11

## 2025-07-08 RX ORDER — LOSARTAN POTASSIUM 100 MG/1
25 TABLET, FILM COATED ORAL DAILY
Refills: 0 | Status: DISCONTINUED | OUTPATIENT
Start: 2025-07-08 | End: 2025-07-11

## 2025-07-08 RX ORDER — HYDROMORPHONE/SOD CHLOR,ISO/PF 2 MG/10 ML
0.5 SYRINGE (ML) INJECTION
Refills: 0 | Status: DISCONTINUED | OUTPATIENT
Start: 2025-07-08 | End: 2025-07-08

## 2025-07-08 RX ORDER — DULOXETINE 20 MG/1
60 CAPSULE, DELAYED RELEASE ORAL DAILY
Refills: 0 | Status: DISCONTINUED | OUTPATIENT
Start: 2025-07-08 | End: 2025-07-11

## 2025-07-08 RX ORDER — MAGNESIUM, ALUMINUM HYDROXIDE 200-200 MG
30 TABLET,CHEWABLE ORAL EVERY 4 HOURS
Refills: 0 | Status: DISCONTINUED | OUTPATIENT
Start: 2025-07-08 | End: 2025-07-11

## 2025-07-08 RX ORDER — CALCIUM CARBONATE 750 MG/1
1 TABLET ORAL DAILY
Refills: 0 | Status: DISCONTINUED | OUTPATIENT
Start: 2025-07-08 | End: 2025-07-11

## 2025-07-08 RX ADMIN — ATORVASTATIN CALCIUM 10 MILLIGRAM(S): 80 TABLET, FILM COATED ORAL at 21:19

## 2025-07-08 RX ADMIN — SODIUM CHLORIDE 100 MILLILITER(S): 9 INJECTION, SOLUTION INTRAVENOUS at 10:12

## 2025-07-08 RX ADMIN — DULOXETINE 60 MILLIGRAM(S): 20 CAPSULE, DELAYED RELEASE ORAL at 11:21

## 2025-07-08 RX ADMIN — CALCIUM CARBONATE 1 TABLET(S): 750 TABLET ORAL at 11:21

## 2025-07-08 RX ADMIN — PREDNISONE 2.5 MILLIGRAM(S): 20 TABLET ORAL at 05:35

## 2025-07-08 RX ADMIN — LOSARTAN POTASSIUM 25 MILLIGRAM(S): 100 TABLET, FILM COATED ORAL at 05:37

## 2025-07-08 RX ADMIN — Medication 40 MILLIGRAM(S): at 05:35

## 2025-07-08 RX ADMIN — TAMSULOSIN HYDROCHLORIDE 0.4 MILLIGRAM(S): 0.4 CAPSULE ORAL at 21:19

## 2025-07-08 NOTE — PROGRESS NOTE ADULT - SUBJECTIVE AND OBJECTIVE BOX
PRE-OP NOTE    Pre-Op Diagnosis: Osteomyelitis  Planned Procedure: Debridement of all non-viable soft tissue and bone with amputation of second toe and partial metatarsal head resection all right foot  Scheduled Date / Time: 7/8/2025 @1500  Indication: Osteomyelitis   Labs:                       13.8   5.87  )-----------( 279      ( 08 Jul 2025 06:15 )             42.9   07-08    138  |  108  |  15  ----------------------------<  114[H]  4.2   |  26  |  1.10    Ca    9.0      08 Jul 2025 06:15      Vitals: Vital Signs Last 24 Hrs  T(C): 36.9 (08 Jul 2025 11:14), Max: 36.9 (08 Jul 2025 11:14)  T(F): 98.4 (08 Jul 2025 11:14), Max: 98.4 (08 Jul 2025 11:14)  HR: 68 (08 Jul 2025 11:14) (64 - 95)  BP: 127/71 (08 Jul 2025 11:14) (125/70 - 146/90)  BP(mean): --  RR: 18 (08 Jul 2025 11:14) (18 - 19)  SpO2: 93% (08 Jul 2025 11:14) (93% - 97%)    Parameters below as of 08 Jul 2025 11:14  Patient On (Oxygen Delivery Method): room air      PE:   Official CXR reading: (On Chart)  Official EKG reading: (On Chart)  Type and Cross/Screen: on chart  NPO after MN  Antibiotics: Per floor regiment  Anesthesia evaluation (on chart)  Operative Consent (on chart)       Patient  had an opportunity to have all questions asked and answered. Patient is aware of all risks, benefits, alternatives and recuperative period involved with the planned surgical procedure.  No assurances or guarantees were given to the patient.  Pt  is aware that serial procedures maybe required if symptoms persist.   Pt consents to the proposed care plan at this time having all of his questions asked and answered to his satisfaction.   Pt understands the severity of his condition and that he is at risk to lose part of the foot all of the foot or even a below knee amputation.          PRE-OP NOTE    Pre-Op Diagnosis: Osteomyelitis  Planned Procedure: Debridement of all non-viable soft tissue and bone with amputation of second toe with deep soft tissue and bone cultures  all right foot  Scheduled Date / Time: 7/8/2025 @1500  Indication: Osteomyelitis right second digit   Labs:                       13.8   5.87  )-----------( 279      ( 08 Jul 2025 06:15 )             42.9   07-08    138  |  108  |  15  ----------------------------<  114[H]  4.2   |  26  |  1.10    Ca    9.0      08 Jul 2025 06:15      Vitals: Vital Signs Last 24 Hrs  T(C): 36.9 (08 Jul 2025 11:14), Max: 36.9 (08 Jul 2025 11:14)  T(F): 98.4 (08 Jul 2025 11:14), Max: 98.4 (08 Jul 2025 11:14)  HR: 68 (08 Jul 2025 11:14) (64 - 95)  BP: 127/71 (08 Jul 2025 11:14) (125/70 - 146/90)  BP(mean): --  RR: 18 (08 Jul 2025 11:14) (18 - 19)  SpO2: 93% (08 Jul 2025 11:14) (93% - 97%)    Parameters below as of 08 Jul 2025 11:14  Patient On (Oxygen Delivery Method): room air      PE:   Official CXR reading: (On Chart)  Official EKG reading: (On Chart)  Type and Cross/Screen: on chart  NPO after MN  Antibiotics: Per floor regiment  Anesthesia evaluation (on chart)  Operative Consent (on chart)       Patient  had an opportunity to have all questions asked and answered. Patient is aware of all risks, benefits, alternatives and recuperative period involved with the planned surgical procedure.  No assurances or guarantees were given to the patient.  Pt  is aware that serial procedures maybe required if symptoms persist.   Pt consents to the proposed care plan at this time having all of his questions asked and answered to his satisfaction.   Pt understands the severity of his condition and that he is at risk to lose part of the foot all of the foot or even a below knee amputation.

## 2025-07-08 NOTE — PRE-OP CHECKLIST - SELECT TESTS ORDERED
BMP/CBC/CMP/PT/PTT/INR/Type and Screen/Urinalysis/EKG/CXR BMP/CBC/CMP/PT/PTT/INR/Type and Cross/Type and Screen/Urinalysis/EKG/CXR

## 2025-07-08 NOTE — PROGRESS NOTE ADULT - SUBJECTIVE AND OBJECTIVE BOX
[facility]  Collin Engle MD PhD, Kusum Brarera MD, Shobha Magdaleno MD, Todd Brown MD, Alfa Sanchez MD  and providing coverage with Claudia Varela MD  Providing Infectious Disease Consultations at [facility], [facility], [facility], [facility]'s    Office# [phone] to schedule follow up appointments  Answering Service for urgent calls or New Consults [phone]  Cell# to text for urgent issues Collin Engle [phone]     infectious diseases progress note:    MATT SCHNEIDER is a 79y y. o. Male patient    Overnight and events of the last 24hrs reviewed    Allergies    No Known Allergies    Intolerances        ANTIBIOTICS/RELEVANT:  antimicrobials  cefTRIAXone   IVPB 2000 milliGRAM(s) IV Intermittent every 24 hours    immunologic:    OTHER:  acetaminophen     Tablet .. 650 milliGRAM(s) Oral every 6 hours PRN  aluminum hydroxide/magnesium hydroxide/simethicone Suspension 30 milliLiter(s) Oral every 4 hours PRN  atorvastatin 10 milliGRAM(s) Oral at bedtime  calcium carbonate   1250 mG (OsCal) 1 Tablet(s) Oral daily  DULoxetine 60 milliGRAM(s) Oral daily  lactated ringers. 1000 milliLiter(s) IV Continuous <Continuous>  losartan 25 milliGRAM(s) Oral daily  melatonin 3 milliGRAM(s) Oral at bedtime PRN  ondansetron Injectable 4 milliGRAM(s) IV Push every 8 hours PRN  pantoprazole    Tablet 40 milliGRAM(s) Oral before breakfast  predniSONE   Tablet 2.5 milliGRAM(s) Oral daily  tamsulosin 0.4 milliGRAM(s) Oral at bedtime      Objective:  Vital Signs Last 24 Hrs  T(C): 36.9 (08 Jul 2025 11:14), Max: 36.9 (08 Jul 2025 11:14)  T(F): 98.4 (08 Jul 2025 11:14), Max: 98.4 (08 Jul 2025 11:14)  HR: 68 (08 Jul 2025 11:14) (64 - 91)  BP: 127/71 (08 Jul 2025 11:14) (125/70 - 136/71)  BP(mean): --  RR: 18 (08 Jul 2025 11:14) (18 - 18)  SpO2: 93% (08 Jul 2025 11:14) (93% - 95%)    Parameters below as of 08 Jul 2025 11:14  Patient On (Oxygen Delivery Method): room air        T(C): 36.9 (07-08-25 @ 11:14), Max: 37.1 (07-07-25 @ 11:08)  T(C): 36.9 (07-08-25 @ 11:14), Max: 37.1 (07-07-25 @ 11:08)  T(C): 36.9 (07-08-25 @ 11:14), Max: 37.1 (07-07-25 @ 11:08)    PHYSICAL EXAM:  HEENT: NC atraumatic  Neck: supple  Respiratory: no accessory muscle use, breathing comfortably  Cardiovascular: distant  Gastrointestinal: normal appearing, nondistended  Extremities: no clubbing, no cyanosis,        LABS:                          13.8   5.87  )-----------( 279      ( 08 Jul 2025 06:15 )             42.9       WBC  5.87 07-08 @ 06:15  5.59 07-07 @ 09:06  6.71 07-06 @ 08:00  6.27 07-05 @ 08:25      07-08    138  |  108  |  15  ----------------------------<  114[H]  4.2   |  26  |  1.10    Ca    9.0      08 Jul 2025 06:15        Creatinine: 1.10 mg/dL (07-08-25 @ 06:15)  Creatinine: 1.20 mg/dL (07-07-25 @ 09:06)  Creatinine: 1.20 mg/dL (07-06-25 @ 08:00)  Creatinine: 1.30 mg/dL (07-05-25 @ 08:25)        Urinalysis Basic - ( 08 Jul 2025 06:15 )    Color: x / Appearance: x / SG: x / pH: x  Gluc: 114 mg/dL / Ketone: x  / Bili: x / Urobili: x   Blood: x / Protein: x / Nitrite: x   Leuk Esterase: x / RBC: x / WBC x   Sq Epi: x / Non Sq Epi: x / Bacteria: x            INFLAMMATORY MARKERS      MICROBIOLOGY:              RADIOLOGY & ADDITIONAL STUDIES:

## 2025-07-08 NOTE — PROGRESS NOTE ADULT - SUBJECTIVE AND OBJECTIVE BOX
[facility] - Jac Cunningham, Quoc, Kevin, Mati Enriquez  Office Number:  [phone]    Patient resting comfortably in bed in NAD.  Laying flat with no respiratory distress.  No complaints of chest pain, dyspnea, palpitations, PND, or orthopnea.    ROS: negative unless otherwise mentioned.    Telemetry:  off    MEDICATIONS  (STANDING):  atorvastatin 10 milliGRAM(s) Oral at bedtime  calcium carbonate   1250 mG (OsCal) 1 Tablet(s) Oral daily  cefTRIAXone   IVPB 2000 milliGRAM(s) IV Intermittent every 24 hours  DULoxetine 60 milliGRAM(s) Oral daily  lactated ringers. 1000 milliLiter(s) (100 mL/Hr) IV Continuous <Continuous>  losartan 25 milliGRAM(s) Oral daily  pantoprazole    Tablet 40 milliGRAM(s) Oral before breakfast  predniSONE   Tablet 2.5 milliGRAM(s) Oral daily  tamsulosin 0.4 milliGRAM(s) Oral at bedtime    MEDICATIONS  (PRN):  acetaminophen     Tablet .. 650 milliGRAM(s) Oral every 6 hours PRN Temp greater or equal to 38C (100.4F), Mild Pain (1 - 3)  aluminum hydroxide/magnesium hydroxide/simethicone Suspension 30 milliLiter(s) Oral every 4 hours PRN Dyspepsia  melatonin 3 milliGRAM(s) Oral at bedtime PRN Insomnia  ondansetron Injectable 4 milliGRAM(s) IV Push every 8 hours PRN Nausea and/or Vomiting      Allergies    No Known Allergies    Intolerances        Vital Signs Last 24 Hrs  T(C): 36.6 (08 Jul 2025 14:25), Max: 36.9 (08 Jul 2025 11:14)  T(F): 97.9 (08 Jul 2025 14:25), Max: 98.4 (08 Jul 2025 11:14)  HR: 95 (08 Jul 2025 14:25) (64 - 95)  BP: 146/90 (08 Jul 2025 14:25) (125/70 - 146/90)  BP(mean): --  RR: 19 (08 Jul 2025 14:25) (18 - 19)  SpO2: 97% (08 Jul 2025 14:25) (93% - 97%)    Parameters below as of 08 Jul 2025 14:25  Patient On (Oxygen Delivery Method): room air        I&O's Summary    08 Jul 2025 07:01  -  08 Jul 2025 14:48  --------------------------------------------------------  IN: 0 mL / OUT: 500 mL / NET: -500 mL        ON EXAM:    General: NAD, awake and alert, oriented x 3  HEENT: Mucous membranes are moist, anicteric  Lungs: Non-labored, breath sounds are clear bilaterally, No wheezing, rales or rhonchi  Cardiovascular: Regular, S1 and S2, no murmurs, rubs, or gallops  Gastrointestinal: Bowel Sounds present, soft, nontender.   Lymph: No peripheral edema. No lymphadenopathy.  Skin: No rashes or ulcers  Psych:  Mood & affect appropriate    LABS: All Labs Reviewed:                        13.8   5.87  )-----------( 279      ( 08 Jul 2025 06:15 )             42.9                         14.1   5.59  )-----------( 275      ( 07 Jul 2025 09:06 )             43.8                         13.6   6.71  )-----------( 244      ( 06 Jul 2025 08:00 )             40.5     08 Jul 2025 06:15    138    |  108    |  15     ----------------------------<  114    4.2     |  26     |  1.10   07 Jul 2025 09:06    138    |  106    |  15     ----------------------------<  128    4.3     |  26     |  1.20   06 Jul 2025 08:00    138    |  108    |  18     ----------------------------<  110    4.0     |  22     |  1.20     Ca    9.0        08 Jul 2025 06:15  Ca    9.4        07 Jul 2025 09:06  Ca    8.7        06 Jul 2025 08:00            Blood Culture: Organism --  Gram Stain Blood -- Gram Stain   No polymorphonuclear leukocytes per low power field  No organisms seen per oil power field  Specimen Source Swab Swab  Culture-Blood --    Organism --  Gram Stain Blood -- Gram Stain --  Specimen Source Blood Blood-Peripheral  Culture-Blood --    Organism --  Gram Stain Blood -- Gram Stain --  Specimen Source Blood Blood-Peripheral  Culture-Blood --

## 2025-07-08 NOTE — CARE COORDINATION ASSESSMENT. - NSCAREPROVIDERS_GEN_ALL_CORE_FT
CARE PROVIDERS:  Accepting Physician: Niranjan Abreu  Admitting: Niranjan Abreu  Attending: Niranjan Abreu  Case Management: Lori Spencer  Consultant: Jose Ritter  Consultant: Collin Nathan  Consultant: Paula De La Cruz  Consultant: Collin Engle  Consultant: Jacques Enriquez  Consultant: Laura Williamson  Consultant: Chioma Thorne  Consultant: Khoa Vazquez  Consultant: Maverick Badillo  Consultant: Kristina Joshua  Covering Team: Naresh Cormier ED Attending: Nahid Mauricio ED Nurse: Alethea Doss  HIM/Billing & Coding: Pari Roldan  Nurse: Sanna Stack  Nurse: Mirella James  Nurse: Aniket Puga  Nurse: Robert Hoffman  Ordered: ServiceAccount, SCMMLM  Override: Aniket Puga  PCA/Nursing Assistant: Ya Sheth  PCA/Nursing Assistant: Andreina Falcon  Primary Team: Jacques Perla  Registered Dietitian: Shirin Thompson  Respiratory Therapy: Binu Harman  UR// Supp. Assoc.: Marleny Root

## 2025-07-08 NOTE — PROGRESS NOTE ADULT - REASON FOR ADMISSION
OM right second digit
Right 2nd toe infection  RLE cellulitis
podiatric surgery
infection right foot

## 2025-07-08 NOTE — PROGRESS NOTE ADULT - SUBJECTIVE AND OBJECTIVE BOX
POST OP NOTE    MATT SCHNEIDER  MRN-[id number]    Date: 07/08/2025  Surgeon: Jose Ritter DPM   Assistants: Kristina Joshua DPM resident Khoa Vazquez DPM resident   Procedure: amputation of the right second digit with flap coverage with deep soft tissue and bone cultures right foot   Pathology: bone an soft tissue   EBL: 5 ml     Patient tolerated both anesthesia and  procedures well and was transported from the operating room to the recovery room with vital signs stable and neurovascular status intact.  Patient transferred to PACU in stable condition.       PE / Post Op Check:       GEN: NAD, AAOx3, resting comfortably with pain controlled      LE Focused: CFT shows adequate perfusion b/l with no signs of ischemic compromise.  No strikethrough is appreciated on surgical dressings.  Dressings were dry, clean, and intact.  No neuromuscular deficits appreciated.

## 2025-07-08 NOTE — PROGRESS NOTE ADULT - PROBLEM SELECTOR PLAN 1
Patient seen and evaluated  pt medically optimized for planned procedures  consent obtained from patient for planned procedures  pt to be brought to the operating room for planned procedures  pt is to be admitted after surgery under the hospitalist service post operatively  will await intraoperative cultures/biopsy to help establish appropriate ABX regimens  will cont to follow and discuss with all attendings.

## 2025-07-08 NOTE — PROGRESS NOTE ADULT - PROBLEM SELECTOR PLAN 1
pt is to be discharged from the recovery room to the floor under the hospitalist service  post operative radiographs ordered right foot   cont IV ABX as per ID   will await intra operative cultures to establish appropriate ABX regimen  will cont to follow and disucss with all attendings

## 2025-07-08 NOTE — CARE COORDINATION ASSESSMENT. - OTHER PERTINENT REFERRAL INFORMATION
Met with patient at the bedside. Explained the role of case management/discharge planning. Patient verbalized understanding. Provided contact information. Patient resides alone and was independent PTA. Patient admitted with a infected toe. Possible osteomyelitis. Discharge needs/plan pending continued hospital stay

## 2025-07-08 NOTE — BRIEF OPERATIVE NOTE - NSICDXBRIEFPROCEDURE_GEN_ALL_CORE_FT
PROCEDURES:  Amputation, toe, 1 toe 08-Jul-2025 15:57:22  Jose Ritter  Fillet flap procedure 08-Jul-2025 15:57:32  Jose Ritter

## 2025-07-08 NOTE — PROGRESS NOTE ADULT - SUBJECTIVE AND OBJECTIVE BOX
Patient is a 79y old  Male who presents with a chief complaint of OM right second digit (08 Jul 2025 15:58)        INTERVAL HPI/OVERNIGHT EVENTS:   no complaints  pt seen and examined         Vital Signs Last 24 Hrs  T(C): 37.2 (08 Jul 2025 16:00), Max: 37.2 (08 Jul 2025 16:00)  T(F): 99 (08 Jul 2025 16:00), Max: 99 (08 Jul 2025 16:00)  HR: 95 (08 Jul 2025 16:15) (64 - 95)  BP: 128/79 (08 Jul 2025 16:15) (120/75 - 146/90)  BP(mean): --  RR: 14 (08 Jul 2025 16:15) (14 - 19)  SpO2: 94% (08 Jul 2025 16:15) (93% - 97%)    Parameters below as of 08 Jul 2025 16:10  Patient On (Oxygen Delivery Method): nasal cannula  O2 Flow (L/min): 2      HYDROmorphone  Injectable 0.5 milliGRAM(s) IV Push every 10 minutes PRN  lactated ringers. 1000 milliLiter(s) IV Continuous <Continuous>  ondansetron Injectable 4 milliGRAM(s) IV Push once PRN      PHYSICAL EXAM:  GENERAL: NAD   EYES: conjunctiva and sclera clear  ENMT: Moist mucous membranes  NECK: Supple, No JVD, Normal thyroid  CHEST/LUNG: non labored, cta b/l  HEART: Regular rate and rhythm; No murmurs, rubs, or gallops  ABDOMEN: Soft, Nontender, Nondistended; Bowel sounds present  EXTREMITIES:  No clubbing, no cyanosis, no edema  LYMPH: No lymphadenopathy noted  SKIN: No rashes or lesions  NEURO: no new focal deficits    Consultant(s) Notes Reviewed:  [x ] YES  [ ] NO  Care Discussed with Consultants/Other Providers [ x] YES  [ ] NO    LABS:                        13.8   5.87  )-----------( 279      ( 08 Jul 2025 06:15 )             42.9     07-08    138  |  108  |  15  ----------------------------<  114[H]  4.2   |  26  |  1.10    Ca    9.0      08 Jul 2025 06:15        Urinalysis Basic - ( 08 Jul 2025 06:15 )    Color: x / Appearance: x / SG: x / pH: x  Gluc: 114 mg/dL / Ketone: x  / Bili: x / Urobili: x   Blood: x / Protein: x / Nitrite: x   Leuk Esterase: x / RBC: x / WBC x   Sq Epi: x / Non Sq Epi: x / Bacteria: x      CAPILLARY BLOOD GLUCOSE            Urinalysis Basic - ( 08 Jul 2025 06:15 )    Color: x / Appearance: x / SG: x / pH: x  Gluc: 114 mg/dL / Ketone: x  / Bili: x / Urobili: x   Blood: x / Protein: x / Nitrite: x   Leuk Esterase: x / RBC: x / WBC x   Sq Epi: x / Non Sq Epi: x / Bacteria: x        Urinalysis with Rflx Culture (collected 05 Jul 2025 17:55)        RADIOLOGY & ADDITIONAL TESTS:    Imaging Personally Reviewed  Reviewed consultants input

## 2025-07-09 LAB
ANION GAP SERPL CALC-SCNC: 10 MMOL/L — SIGNIFICANT CHANGE UP (ref 5–17)
BUN SERPL-MCNC: 23 MG/DL — SIGNIFICANT CHANGE UP (ref 7–23)
CALCIUM SERPL-MCNC: 8.5 MG/DL — SIGNIFICANT CHANGE UP (ref 8.5–10.1)
CHLORIDE SERPL-SCNC: 105 MMOL/L — SIGNIFICANT CHANGE UP (ref 96–108)
CO2 SERPL-SCNC: 22 MMOL/L — SIGNIFICANT CHANGE UP (ref 22–31)
CREAT SERPL-MCNC: 0.92 MG/DL — SIGNIFICANT CHANGE UP (ref 0.5–1.3)
EGFR: 86 ML/MIN/1.73M2 — SIGNIFICANT CHANGE UP
EGFR: 86 ML/MIN/1.73M2 — SIGNIFICANT CHANGE UP
GLUCOSE SERPL-MCNC: 120 MG/DL — HIGH (ref 70–99)
GRAM STN FLD: SIGNIFICANT CHANGE UP
HCT VFR BLD CALC: 40.6 % — SIGNIFICANT CHANGE UP (ref 39–50)
HGB BLD-MCNC: 13.3 G/DL — SIGNIFICANT CHANGE UP (ref 13–17)
MCHC RBC-ENTMCNC: 29 PG — SIGNIFICANT CHANGE UP (ref 27–34)
MCHC RBC-ENTMCNC: 32.8 G/DL — SIGNIFICANT CHANGE UP (ref 32–36)
MCV RBC AUTO: 88.5 FL — SIGNIFICANT CHANGE UP (ref 80–100)
NIGHT BLUE STAIN TISS: SIGNIFICANT CHANGE UP
NRBC # BLD AUTO: 0 K/UL — SIGNIFICANT CHANGE UP (ref 0–0)
NRBC # FLD: 0 K/UL — SIGNIFICANT CHANGE UP (ref 0–0)
NRBC BLD AUTO-RTO: 0 /100 WBCS — SIGNIFICANT CHANGE UP (ref 0–0)
PLATELET # BLD AUTO: 299 K/UL — SIGNIFICANT CHANGE UP (ref 150–400)
PMV BLD: 10.7 FL — SIGNIFICANT CHANGE UP (ref 7–13)
POTASSIUM SERPL-MCNC: 4.1 MMOL/L — SIGNIFICANT CHANGE UP (ref 3.5–5.3)
POTASSIUM SERPL-SCNC: 4.1 MMOL/L — SIGNIFICANT CHANGE UP (ref 3.5–5.3)
RBC # BLD: 4.59 M/UL — SIGNIFICANT CHANGE UP (ref 4.2–5.8)
RBC # FLD: 14 % — SIGNIFICANT CHANGE UP (ref 10.3–14.5)
SODIUM SERPL-SCNC: 137 MMOL/L — SIGNIFICANT CHANGE UP (ref 135–145)
SPECIMEN SOURCE: SIGNIFICANT CHANGE UP
WBC # BLD: 10.84 K/UL — HIGH (ref 3.8–10.5)
WBC # FLD AUTO: 10.84 K/UL — HIGH (ref 3.8–10.5)

## 2025-07-09 PROCEDURE — 99232 SBSQ HOSP IP/OBS MODERATE 35: CPT

## 2025-07-09 RX ORDER — CEFTRIAXONE 500 MG/1
2000 INJECTION, POWDER, FOR SOLUTION INTRAMUSCULAR; INTRAVENOUS EVERY 24 HOURS
Refills: 0 | Status: DISCONTINUED | OUTPATIENT
Start: 2025-07-09 | End: 2025-07-11

## 2025-07-09 RX ADMIN — DULOXETINE 60 MILLIGRAM(S): 20 CAPSULE, DELAYED RELEASE ORAL at 12:08

## 2025-07-09 RX ADMIN — CALCIUM CARBONATE 1 TABLET(S): 750 TABLET ORAL at 12:08

## 2025-07-09 RX ADMIN — TAMSULOSIN HYDROCHLORIDE 0.4 MILLIGRAM(S): 0.4 CAPSULE ORAL at 21:28

## 2025-07-09 RX ADMIN — PREDNISONE 2.5 MILLIGRAM(S): 20 TABLET ORAL at 05:16

## 2025-07-09 RX ADMIN — ATORVASTATIN CALCIUM 10 MILLIGRAM(S): 80 TABLET, FILM COATED ORAL at 21:28

## 2025-07-09 RX ADMIN — Medication 40 MILLIGRAM(S): at 05:16

## 2025-07-09 RX ADMIN — ENOXAPARIN SODIUM 40 MILLIGRAM(S): 100 INJECTION SUBCUTANEOUS at 05:16

## 2025-07-09 RX ADMIN — LOSARTAN POTASSIUM 25 MILLIGRAM(S): 100 TABLET, FILM COATED ORAL at 05:16

## 2025-07-09 RX ADMIN — CEFTRIAXONE 100 MILLIGRAM(S): 500 INJECTION, POWDER, FOR SOLUTION INTRAMUSCULAR; INTRAVENOUS at 12:59

## 2025-07-09 NOTE — PROGRESS NOTE ADULT - PROBLEM SELECTOR PLAN 1
Patient evaluated and chart reviewed.  POD#1 s/p 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  No signs of post-operative infection, hematoma, or dehiscence at this time.  DSD reapplied with Aquacel Aq, gauze, and tere.  Surgical pathology report pending.  Continue IV abx per ID recs.  Podiatry will continue to follow while in-house.  Plan to be discussed with attending.

## 2025-07-09 NOTE — PHYSICAL THERAPY INITIAL EVALUATION ADULT - PERTINENT HX OF CURRENT PROBLEM, REHAB EVAL
80 y/o m adm 7/5 with PMR, CAD, GERD, HTN, HLD p/w toe infection. MRI R foot: + acute osteomyelitis 2nd toe. 7/5 dopplers: negative DVT BLE . 7/8- s/p R metatarsal amputation and fillet procedure

## 2025-07-09 NOTE — PHYSICAL THERAPY INITIAL EVALUATION ADULT - ASSISTIVE DEVICE:SUPINE/SIT, REHAB EVAL
Add 24698 Cpt? (Important Note: In 2017 The Use Of 78647 Is Being Tracked By Cms To Determine Future Global Period Reimbursement For Global Periods): no Detail Level: Simple bed rails

## 2025-07-09 NOTE — CASE MANAGEMENT PROGRESS NOTE - NSCMPROGRESSNOTE_GEN_ALL_CORE
Discussed patient in team rounds and with Dr. Abreu.  Patient remains on Rocephin IV, awaiting final OR path results. PT=NO NEEDS. Pt has surgical shoe and PT provided straight cane to pt.  Pt states he may stay with his son on DC from hospital. CM will continue to follow.

## 2025-07-09 NOTE — PROGRESS NOTE ADULT - SUBJECTIVE AND OBJECTIVE BOX
[facility]  Collin Engle MD PhD, Kusum Barrera MD, Shobha Magdaleno MD, Todd Brown MD, Alfa Sanchez MD  and providing coverage with Claudia Varela MD  Providing Infectious Disease Consultations at [facility], [facility], [facility]'s    Office# [phone] to schedule follow up appointments  Answering Service for urgent calls or New Consults [phone]  Cell# to text for urgent issues Collin Engle [phone]     infectious diseases progress note:    MATT SCHNEIDER is a 79y y. o. Male patient    Overnight and events of the last 24hrs reviewed    Allergies    No Known Allergies    Intolerances        ANTIBIOTICS/RELEVANT:  antimicrobials    immunologic:    OTHER:  acetaminophen     Tablet .. 650 milliGRAM(s) Oral every 6 hours PRN  aluminum hydroxide/magnesium hydroxide/simethicone Suspension 30 milliLiter(s) Oral every 4 hours PRN  atorvastatin 10 milliGRAM(s) Oral at bedtime  calcium carbonate   1250 mG (OsCal) 1 Tablet(s) Oral daily  DULoxetine 60 milliGRAM(s) Oral daily  enoxaparin Injectable 40 milliGRAM(s) SubCutaneous every 24 hours  losartan 25 milliGRAM(s) Oral daily  melatonin 3 milliGRAM(s) Oral at bedtime PRN  pantoprazole    Tablet 40 milliGRAM(s) Oral before breakfast  predniSONE   Tablet 2.5 milliGRAM(s) Oral daily  tamsulosin 0.4 milliGRAM(s) Oral at bedtime      Objective:  Vital Signs Last 24 Hrs  T(C): 36.4 (09 Jul 2025 05:04), Max: 37.2 (08 Jul 2025 16:00)  T(F): 97.6 (09 Jul 2025 05:04), Max: 99 (08 Jul 2025 16:00)  HR: 70 (09 Jul 2025 05:04) (70 - 95)  BP: 134/69 (09 Jul 2025 05:04) (105/64 - 146/90)  BP(mean): --  RR: 18 (09 Jul 2025 05:04) (12 - 19)  SpO2: 93% (09 Jul 2025 05:04) (91% - 100%)    Parameters below as of 09 Jul 2025 05:04  Patient On (Oxygen Delivery Method): room air        T(C): 36.4 (07-09-25 @ 05:04), Max: 37.2 (07-08-25 @ 16:00)  T(C): 36.4 (07-09-25 @ 05:04), Max: 37.2 (07-08-25 @ 16:00)  T(C): 36.4 (07-09-25 @ 05:04), Max: 37.2 (07-08-25 @ 16:00)    PHYSICAL EXAM:  HEENT: NC atraumatic  Neck: supple  Respiratory: no accessory muscle use, breathing comfortably  Cardiovascular: distant  Gastrointestinal: normal appearing, nondistended  Extremities: no clubbing, no cyanosis,        LABS:                          13.3   10.84 )-----------( 299      ( 09 Jul 2025 07:20 )             40.6       WBC  10.84 07-09 @ 07:20  5.87 07-08 @ 06:15  5.59 07-07 @ 09:06  6.71 07-06 @ 08:00  6.27 07-05 @ 08:25      07-09    137  |  105  |  23  ----------------------------<  120[H]  4.1   |  22  |  0.92    Ca    8.5      09 Jul 2025 07:20        Creatinine: 0.92 mg/dL (07-09-25 @ 07:20)  Creatinine: 1.10 mg/dL (07-08-25 @ 06:15)  Creatinine: 1.20 mg/dL (07-07-25 @ 09:06)  Creatinine: 1.20 mg/dL (07-06-25 @ 08:00)  Creatinine: 1.30 mg/dL (07-05-25 @ 08:25)        Urinalysis Basic - ( 09 Jul 2025 07:20 )    Color: x / Appearance: x / SG: x / pH: x  Gluc: 120 mg/dL / Ketone: x  / Bili: x / Urobili: x   Blood: x / Protein: x / Nitrite: x   Leuk Esterase: x / RBC: x / WBC x   Sq Epi: x / Non Sq Epi: x / Bacteria: x            INFLAMMATORY MARKERS      MICROBIOLOGY:              RADIOLOGY & ADDITIONAL STUDIES:

## 2025-07-09 NOTE — PROGRESS NOTE ADULT - SUBJECTIVE AND OBJECTIVE BOX
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 Patient relates no overnight events and states that they are doing well at this time.  Denies any fever, chills, nausea, vomiting, chest pain, shortness of breath, or calf pain at this time.    Allergies    No Known Allergies    Intolerances        MEDICATIONS  (STANDING):  atorvastatin 10 milliGRAM(s) Oral at bedtime  calcium carbonate   1250 mG (OsCal) 1 Tablet(s) Oral daily  DULoxetine 60 milliGRAM(s) Oral daily  enoxaparin Injectable 40 milliGRAM(s) SubCutaneous every 24 hours  losartan 25 milliGRAM(s) Oral daily  pantoprazole    Tablet 40 milliGRAM(s) Oral before breakfast  predniSONE   Tablet 2.5 milliGRAM(s) Oral daily  tamsulosin 0.4 milliGRAM(s) Oral at bedtime    MEDICATIONS  (PRN):  acetaminophen     Tablet .. 650 milliGRAM(s) Oral every 6 hours PRN Temp greater or equal to 38C (100.4F), Mild Pain (1 - 3)  aluminum hydroxide/magnesium hydroxide/simethicone Suspension 30 milliLiter(s) Oral every 4 hours PRN Dyspepsia  melatonin 3 milliGRAM(s) Oral at bedtime PRN Insomnia      Vital Signs Last 24 Hrs  T(C): 36.4 (09 Jul 2025 05:04), Max: 37.2 (08 Jul 2025 16:00)  T(F): 97.6 (09 Jul 2025 05:04), Max: 99 (08 Jul 2025 16:00)  HR: 70 (09 Jul 2025 05:04) (68 - 95)  BP: 134/69 (09 Jul 2025 05:04) (105/64 - 146/90)  BP(mean): --  RR: 18 (09 Jul 2025 05:04) (12 - 19)  SpO2: 93% (09 Jul 2025 05:04) (91% - 100%)    Parameters below as of 09 Jul 2025 05:04  Patient On (Oxygen Delivery Method): room air        PHYSICAL EXAM:  Vascular: Dorsalis Pedis and Posterior Tibial pulses 2/4.  Capillary re-fill time less then 3 seconds digits 1-5 left, 1,3-5 right.    Neuro: Protective sensation diminished to the level of the digits bilateral.  MSK: Muscle strength 5/5 all major muscle groups bilateral.  Dermatological: Incision site of the right foot noted with intact sutures, no dehiscence, mild karthikeyan-incision erythema, no proximal streaking, no fluctuance, no malodor, no signs of infection at this time.                          13.8   5.87  )-----------( 279      ( 08 Jul 2025 06:15 )             42.9       07-08    138  |  108  |  15  ----------------------------<  114[H]  4.2   |  26  |  1.10    Ca    9.0      08 Jul 2025 06:15              Culture - Tissue with Gram Stain (collected 08 Jul 2025 16:00)  Source: Tissue Other, RIGHT SECOND TOE  Gram Stain (09 Jul 2025 06:36):    No polymorphonuclear cells seen per low power field    No organisms seen per oil power field    Culture - Tissue with Gram Stain (collected 08 Jul 2025 16:00)  Source: Tissue Other, RIGHT 2ND METATARSAL  Gram Stain (09 Jul 2025 06:37):    No polymorphonuclear cells seen per low power field    No organisms seen per oil power field    Culture - Wound Aerobic/Anaerobic (collected 08 Jul 2025 16:00)  Source: Surgical Swab Surgical Swab  Gram Stain (09 Jul 2025 07:46):    Few polymorphonuclear leukocytes per low power field    No organisms seen per oil power field           79y year old Male seen at [facility] [address] W1 s/p 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 Patient relates no overnight events and states that they are doing well at this time.  Denies any fever, chills, nausea, vomiting, chest pain, shortness of breath, or calf pain at this time.    Allergies    No Known Allergies    Intolerances        MEDICATIONS  (STANDING):  atorvastatin 10 milliGRAM(s) Oral at bedtime  calcium carbonate   1250 mG (OsCal) 1 Tablet(s) Oral daily  DULoxetine 60 milliGRAM(s) Oral daily  enoxaparin Injectable 40 milliGRAM(s) SubCutaneous every 24 hours  losartan 25 milliGRAM(s) Oral daily  pantoprazole    Tablet 40 milliGRAM(s) Oral before breakfast  predniSONE   Tablet 2.5 milliGRAM(s) Oral daily  tamsulosin 0.4 milliGRAM(s) Oral at bedtime    MEDICATIONS  (PRN):  acetaminophen     Tablet .. 650 milliGRAM(s) Oral every 6 hours PRN Temp greater or equal to 38C (100.4F), Mild Pain (1 - 3)  aluminum hydroxide/magnesium hydroxide/simethicone Suspension 30 milliLiter(s) Oral every 4 hours PRN Dyspepsia  melatonin 3 milliGRAM(s) Oral at bedtime PRN Insomnia      Vital Signs Last 24 Hrs  T(C): 36.4 (09 Jul 2025 05:04), Max: 37.2 (08 Jul 2025 16:00)  T(F): 97.6 (09 Jul 2025 05:04), Max: 99 (08 Jul 2025 16:00)  HR: 70 (09 Jul 2025 05:04) (68 - 95)  BP: 134/69 (09 Jul 2025 05:04) (105/64 - 146/90)  BP(mean): --  RR: 18 (09 Jul 2025 05:04) (12 - 19)  SpO2: 93% (09 Jul 2025 05:04) (91% - 100%)    Parameters below as of 09 Jul 2025 05:04  Patient On (Oxygen Delivery Method): room air        PHYSICAL EXAM:  Vascular: Dorsalis Pedis and Posterior Tibial pulses 2/4.  Capillary re-fill time less then 3 seconds digits 1-5 left, 1,3-5 right.    Neuro: Protective sensation diminished to the level of the digits bilateral.  MSK: Muscle strength 5/5 all major muscle groups bilateral.  Dermatological: Incision site of the right foot noted with intact sutures, no dehiscence, mild karthikeyan-incision erythema, no proximal streaking, no fluctuance, no malodor, no signs of infection at this time. The region of concern is responding favorable to the current care plan.                           13.8   5.87  )-----------( 279      ( 08 Jul 2025 06:15 )             42.9       07-08    138  |  108  |  15  ----------------------------<  114[H]  4.2   |  26  |  1.10    Ca    9.0      08 Jul 2025 06:15              Culture - Tissue with Gram Stain (collected 08 Jul 2025 16:00)  Source: Tissue Other, RIGHT SECOND TOE  Gram Stain (09 Jul 2025 06:36):    No polymorphonuclear cells seen per low power field    No organisms seen per oil power field    Culture - Tissue with Gram Stain (collected 08 Jul 2025 16:00)  Source: Tissue Other, RIGHT 2ND METATARSAL  Gram Stain (09 Jul 2025 06:37):    No polymorphonuclear cells seen per low power field    No organisms seen per oil power field    Culture - Wound Aerobic/Anaerobic (collected 08 Jul 2025 16:00)  Source: Surgical Swab Surgical Swab  Gram Stain (09 Jul 2025 07:46):    Few polymorphonuclear leukocytes per low power field    No organisms seen per oil power field

## 2025-07-09 NOTE — PROGRESS NOTE ADULT - SUBJECTIVE AND OBJECTIVE BOX
Patient is a 79y old  Male who presents with a chief complaint of OM right second digit (08 Jul 2025 15:58)        INTERVAL HPI/OVERNIGHT EVENTS:   no complaints  pt seen and examined         Vital Signs Last 24 Hrs  T(C): 36.9 (09 Jul 2025 12:32), Max: 36.9 (09 Jul 2025 12:32)  T(F): 98.4 (09 Jul 2025 12:32), Max: 98.4 (09 Jul 2025 12:32)  HR: 72 (09 Jul 2025 12:32) (70 - 95)  BP: 121/75 (09 Jul 2025 12:32) (105/64 - 134/69)  BP(mean): --  RR: 18 (09 Jul 2025 12:32) (12 - 18)  SpO2: 93% (09 Jul 2025 12:32) (91% - 100%)    Parameters below as of 09 Jul 2025 12:32  Patient On (Oxygen Delivery Method): room air        acetaminophen     Tablet .. 650 milliGRAM(s) Oral every 6 hours PRN  aluminum hydroxide/magnesium hydroxide/simethicone Suspension 30 milliLiter(s) Oral every 4 hours PRN  atorvastatin 10 milliGRAM(s) Oral at bedtime  calcium carbonate   1250 mG (OsCal) 1 Tablet(s) Oral daily  cefTRIAXone   IVPB 2000 milliGRAM(s) IV Intermittent every 24 hours  DULoxetine 60 milliGRAM(s) Oral daily  enoxaparin Injectable 40 milliGRAM(s) SubCutaneous every 24 hours  losartan 25 milliGRAM(s) Oral daily  melatonin 3 milliGRAM(s) Oral at bedtime PRN  pantoprazole    Tablet 40 milliGRAM(s) Oral before breakfast  predniSONE   Tablet 2.5 milliGRAM(s) Oral daily  tamsulosin 0.4 milliGRAM(s) Oral at bedtime      PHYSICAL EXAM:  GENERAL: NAD   EYES: conjunctiva and sclera clear  ENMT: Moist mucous membranes  NECK: Supple, No JVD, Normal thyroid  CHEST/LUNG: non labored, cta b/l  HEART: Regular rate and rhythm; No murmurs, rubs, or gallops  ABDOMEN: Soft, Nontender, Nondistended; Bowel sounds present  EXTREMITIES:  No clubbing, no cyanosis, no edema  LYMPH: No lymphadenopathy noted  SKIN: No rashes or lesions  NEURO: no new focal deficits    Consultant(s) Notes Reviewed:  [x ] YES  [ ] NO  Care Discussed with Consultants/Other Providers [ x] YES  [ ] NO    LABS:                        13.3   10.84 )-----------( 299      ( 09 Jul 2025 07:20 )             40.6     07-09    137  |  105  |  23  ----------------------------<  120[H]  4.1   |  22  |  0.92    Ca    8.5      09 Jul 2025 07:20        Urinalysis Basic - ( 09 Jul 2025 07:20 )    Color: x / Appearance: x / SG: x / pH: x  Gluc: 120 mg/dL / Ketone: x  / Bili: x / Urobili: x   Blood: x / Protein: x / Nitrite: x   Leuk Esterase: x / RBC: x / WBC x   Sq Epi: x / Non Sq Epi: x / Bacteria: x      CAPILLARY BLOOD GLUCOSE            Urinalysis Basic - ( 09 Jul 2025 07:20 )    Color: x / Appearance: x / SG: x / pH: x  Gluc: 120 mg/dL / Ketone: x  / Bili: x / Urobili: x   Blood: x / Protein: x / Nitrite: x   Leuk Esterase: x / RBC: x / WBC x   Sq Epi: x / Non Sq Epi: x / Bacteria: x        Culture - Tissue with Gram Stain (collected 08 Jul 2025 16:00)  Source: Tissue Other, RIGHT SECOND TOE  Gram Stain (09 Jul 2025 06:36):    No polymorphonuclear cells seen per low power field    No organisms seen per oil power field    Culture - Acid Fast - Tissue w/Smear (collected 08 Jul 2025 16:00)  Source: Tissue Other, right foot    Culture - Tissue with Gram Stain (collected 08 Jul 2025 16:00)  Source: Tissue Other, RIGHT 2ND METATARSAL  Gram Stain (09 Jul 2025 06:37):    No polymorphonuclear cells seen per low power field    No organisms seen per oil power field    Culture - Wound Aerobic/Anaerobic (collected 08 Jul 2025 16:00)  Source: Surgical Swab Surgical Swab  Gram Stain (09 Jul 2025 07:46):    Few polymorphonuclear leukocytes per low power field    No organisms seen per oil power field        RADIOLOGY & ADDITIONAL TESTS:    Imaging Personally Reviewed  Reviewed consultants input

## 2025-07-09 NOTE — PROGRESS NOTE ADULT - SUBJECTIVE AND OBJECTIVE BOX
[facility] -- Jac Cunningham Pannella, Patel, Savella, Goodger, Cohen: Office # [phone]    Follow Up:  Cardiac   clearance     Subjective/Observations: No events overnight resting comfortably in bed.  No complaints of chest pain, dyspnea, or palpitations reported. No signs of orthopnea or PND.     REVIEW OF SYSTEMS: All other review of systems are negative unless indicated above    PAST MEDICAL & SURGICAL HISTORY:  Arthritis      GERD (gastroesophageal reflux disease)      PMR (polymyalgia rheumatica)  on steroids      FH: HTN (hypertension)      CAD (coronary artery disease)      Hiatal hernia      No significant past surgical history          MEDICATIONS  (STANDING):  atorvastatin 10 milliGRAM(s) Oral at bedtime  calcium carbonate   1250 mG (OsCal) 1 Tablet(s) Oral daily  DULoxetine 60 milliGRAM(s) Oral daily  enoxaparin Injectable 40 milliGRAM(s) SubCutaneous every 24 hours  losartan 25 milliGRAM(s) Oral daily  pantoprazole    Tablet 40 milliGRAM(s) Oral before breakfast  predniSONE   Tablet 2.5 milliGRAM(s) Oral daily  tamsulosin 0.4 milliGRAM(s) Oral at bedtime    MEDICATIONS  (PRN):  acetaminophen     Tablet .. 650 milliGRAM(s) Oral every 6 hours PRN Temp greater or equal to 38C (100.4F), Mild Pain (1 - 3)  aluminum hydroxide/magnesium hydroxide/simethicone Suspension 30 milliLiter(s) Oral every 4 hours PRN Dyspepsia  melatonin 3 milliGRAM(s) Oral at bedtime PRN Insomnia    Allergies    No Known Allergies    Intolerances      Vital Signs Last 24 Hrs  T(C): 36.4 (09 Jul 2025 05:04), Max: 37.2 (08 Jul 2025 16:00)  T(F): 97.6 (09 Jul 2025 05:04), Max: 99 (08 Jul 2025 16:00)  HR: 70 (09 Jul 2025 05:04) (70 - 95)  BP: 134/69 (09 Jul 2025 05:04) (105/64 - 146/90)  BP(mean): --  RR: 18 (09 Jul 2025 05:04) (12 - 19)  SpO2: 93% (09 Jul 2025 05:04) (91% - 100%)    Parameters below as of 09 Jul 2025 05:04  Patient On (Oxygen Delivery Method): room air      I&O's Summary    08 Jul 2025 07:01  -  09 Jul 2025 07:00  --------------------------------------------------------  IN: 0 mL / OUT: 600 mL / NET: -600 mL    09 Jul 2025 07:01  -  09 Jul 2025 12:22  --------------------------------------------------------  IN: 0 mL / OUT: 500 mL / NET: -500 mL      Weight (kg): 104.3 (07-08 @ 13:33)    TELE: Off cardiac monitor   PHYSICAL EXAM:  Constitutional: NAD, awake and alert  HEENT: Moist Mucous Membranes, Anicteric  Pulmonary: Non-labored, breath sounds are clear bilaterally, No wheezing, rales or rhonchi  Cardiovascular: Regular, S1 and S2, No murmurs, No rubs, gallops or clicks  Gastrointestinal:  soft, nontender, nondistended   Lymph: No peripheral edema. No lymphadenopathy.   Skin: No visible rashes or ulcers.  Psych:  Mood & affect appropriate      LABS: All Labs Reviewed:                        13.3   10.84 )-----------( 299      ( 09 Jul 2025 07:20 )             40.6                         13.8   5.87  )-----------( 279      ( 08 Jul 2025 06:15 )             42.9                         14.1   5.59  )-----------( 275      ( 07 Jul 2025 09:06 )             43.8     09 Jul 2025 07:20    137    |  105    |  23     ----------------------------<  120    4.1     |  22     |  0.92   08 Jul 2025 06:15    138    |  108    |  15     ----------------------------<  114    4.2     |  26     |  1.10   07 Jul 2025 09:06    138    |  106    |  15     ----------------------------<  128    4.3     |  26     |  1.20     Ca    8.5        09 Jul 2025 07:20  Ca    9.0        08 Jul 2025 06:15  Ca    9.4        07 Jul 2025 09:06         12 Lead ECG:   Ventricular Rate 66 BPM    Atrial Rate 66 BPM    P-R Interval 282 ms    QRS Duration 110 ms    Q-T Interval 402 ms    QTC Calculation(Bazett) 421 ms    P Axis 69 degrees    R Axis -14 degrees    T Axis -4 degrees    Diagnosis Line Sinus rhythm [id number] degree AV block  Septal infarct (cited on or before 02-JUN-2011)  Abnormal ECG (07-05-25 @ 10:04)     [facility] -- Jac Cunningham Pannella, Patel, Savella, Goodger, Cohen: Office # [phone]    Follow Up:  Cardiac   clearance     Subjective/Observations: No events overnight resting comfortably in bed.  No complaints of chest pain, dyspnea, or palpitations reported. No signs of orthopnea or PND.     REVIEW OF SYSTEMS: All other review of systems are negative unless indicated above    PAST MEDICAL & SURGICAL HISTORY:  Arthritis      GERD (gastroesophageal reflux disease)      PMR (polymyalgia rheumatica)  on steroids      FH: HTN (hypertension)      CAD (coronary artery disease)      Hiatal hernia      No significant past surgical history          MEDICATIONS  (STANDING):  atorvastatin 10 milliGRAM(s) Oral at bedtime  calcium carbonate   1250 mG (OsCal) 1 Tablet(s) Oral daily  DULoxetine 60 milliGRAM(s) Oral daily  enoxaparin Injectable 40 milliGRAM(s) SubCutaneous every 24 hours  losartan 25 milliGRAM(s) Oral daily  pantoprazole    Tablet 40 milliGRAM(s) Oral before breakfast  predniSONE   Tablet 2.5 milliGRAM(s) Oral daily  tamsulosin 0.4 milliGRAM(s) Oral at bedtime    MEDICATIONS  (PRN):  acetaminophen     Tablet .. 650 milliGRAM(s) Oral every 6 hours PRN Temp greater or equal to 38C (100.4F), Mild Pain (1 - 3)  aluminum hydroxide/magnesium hydroxide/simethicone Suspension 30 milliLiter(s) Oral every 4 hours PRN Dyspepsia  melatonin 3 milliGRAM(s) Oral at bedtime PRN Insomnia    Allergies    No Known Allergies    Intolerances      Vital Signs Last 24 Hrs  T(C): 36.4 (09 Jul 2025 05:04), Max: 37.2 (08 Jul 2025 16:00)  T(F): 97.6 (09 Jul 2025 05:04), Max: 99 (08 Jul 2025 16:00)  HR: 70 (09 Jul 2025 05:04) (70 - 95)  BP: 134/69 (09 Jul 2025 05:04) (105/64 - 146/90)  BP(mean): --  RR: 18 (09 Jul 2025 05:04) (12 - 19)  SpO2: 93% (09 Jul 2025 05:04) (91% - 100%)    Parameters below as of 09 Jul 2025 05:04  Patient On (Oxygen Delivery Method): room air      I&O's Summary    08 Jul 2025 07:01  -  09 Jul 2025 07:00  --------------------------------------------------------  IN: 0 mL / OUT: 600 mL / NET: -600 mL    09 Jul 2025 07:01  -  09 Jul 2025 12:22  --------------------------------------------------------  IN: 0 mL / OUT: 500 mL / NET: -500 mL      Weight (kg): 104.3 (07-08 @ 13:33)    TELE: Off cardiac monitor   PHYSICAL EXAM:  Constitutional: NAD, awake and alert  HEENT: Moist Mucous Membranes, Anicteric  Pulmonary: Non-labored, breath sounds are clear bilaterally, No wheezing, rales or rhonchi  Cardiovascular: Regular, S1 and S2, No murmurs, No rubs, gallops or clicks  Gastrointestinal:  soft, nontender, nondistended   Lymph: No peripheral edema. No lymphadenopathy.   Skin: No visible rashes or ulcers.  Psych:  Mood & affect appropriate      LABS: All Labs Reviewed:                        13.3   10.84 )-----------( 299      ( 09 Jul 2025 07:20 )             40.6                         13.8   5.87  )-----------( 279      ( 08 Jul 2025 06:15 )             42.9                         14.1   5.59  )-----------( 275      ( 07 Jul 2025 09:06 )             43.8     09 Jul 2025 07:20    137    |  105    |  23     ----------------------------<  120    4.1     |  22     |  0.92   08 Jul 2025 06:15    138    |  108    |  15     ----------------------------<  114    4.2     |  26     |  1.10   07 Jul 2025 09:06    138    |  106    |  15     ----------------------------<  128    4.3     |  26     |  1.20     Ca    8.5        09 Jul 2025 07:20  Ca    9.0        08 Jul 2025 06:15  Ca    9.4        07 Jul 2025 09:06         12 Lead ECG:   Ventricular Rate 66 BPM    Atrial Rate 66 BPM    P-R Interval 282 ms    QRS Duration 110 ms    Q-T Interval 402 ms    QTC Calculation(Bazett) 421 ms    P Axis 69 degrees    R Axis -14 degrees    T Axis -4 degrees    Diagnosis Line Sinus rhythm [id number] degree AV block  Septal infarct (cited on or before 02-JUN-2011)  Abnormal ECG (07-05-25 @ 10:04)

## 2025-07-10 LAB
ANION GAP SERPL CALC-SCNC: 7 MMOL/L — SIGNIFICANT CHANGE UP (ref 5–17)
BUN SERPL-MCNC: 21 MG/DL — SIGNIFICANT CHANGE UP (ref 7–23)
CALCIUM SERPL-MCNC: 8.6 MG/DL — SIGNIFICANT CHANGE UP (ref 8.5–10.1)
CHLORIDE SERPL-SCNC: 107 MMOL/L — SIGNIFICANT CHANGE UP (ref 96–108)
CO2 SERPL-SCNC: 26 MMOL/L — SIGNIFICANT CHANGE UP (ref 22–31)
CREAT SERPL-MCNC: 1.1 MG/DL — SIGNIFICANT CHANGE UP (ref 0.5–1.3)
CULTURE RESULTS: ABNORMAL
CULTURE RESULTS: SIGNIFICANT CHANGE UP
CULTURE RESULTS: SIGNIFICANT CHANGE UP
EGFR: 69 ML/MIN/1.73M2 — SIGNIFICANT CHANGE UP
EGFR: 69 ML/MIN/1.73M2 — SIGNIFICANT CHANGE UP
GLUCOSE SERPL-MCNC: 125 MG/DL — HIGH (ref 70–99)
HCT VFR BLD CALC: 40.3 % — SIGNIFICANT CHANGE UP (ref 39–50)
HGB BLD-MCNC: 13.5 G/DL — SIGNIFICANT CHANGE UP (ref 13–17)
MCHC RBC-ENTMCNC: 29.7 PG — SIGNIFICANT CHANGE UP (ref 27–34)
MCHC RBC-ENTMCNC: 33.5 G/DL — SIGNIFICANT CHANGE UP (ref 32–36)
MCV RBC AUTO: 88.8 FL — SIGNIFICANT CHANGE UP (ref 80–100)
NRBC # BLD AUTO: 0 K/UL — SIGNIFICANT CHANGE UP (ref 0–0)
NRBC # FLD: 0 K/UL — SIGNIFICANT CHANGE UP (ref 0–0)
NRBC BLD AUTO-RTO: 0 /100 WBCS — SIGNIFICANT CHANGE UP (ref 0–0)
ORGANISM # SPEC MICROSCOPIC CNT: ABNORMAL
ORGANISM # SPEC MICROSCOPIC CNT: SIGNIFICANT CHANGE UP
PLATELET # BLD AUTO: 277 K/UL — SIGNIFICANT CHANGE UP (ref 150–400)
PMV BLD: 10.3 FL — SIGNIFICANT CHANGE UP (ref 7–13)
POTASSIUM SERPL-MCNC: 3.7 MMOL/L — SIGNIFICANT CHANGE UP (ref 3.5–5.3)
POTASSIUM SERPL-SCNC: 3.7 MMOL/L — SIGNIFICANT CHANGE UP (ref 3.5–5.3)
RBC # BLD: 4.54 M/UL — SIGNIFICANT CHANGE UP (ref 4.2–5.8)
RBC # FLD: 14.3 % — SIGNIFICANT CHANGE UP (ref 10.3–14.5)
SODIUM SERPL-SCNC: 140 MMOL/L — SIGNIFICANT CHANGE UP (ref 135–145)
SPECIMEN SOURCE: SIGNIFICANT CHANGE UP
WBC # BLD: 6.81 K/UL — SIGNIFICANT CHANGE UP (ref 3.8–10.5)
WBC # FLD AUTO: 6.81 K/UL — SIGNIFICANT CHANGE UP (ref 3.8–10.5)

## 2025-07-10 PROCEDURE — 99232 SBSQ HOSP IP/OBS MODERATE 35: CPT

## 2025-07-10 RX ADMIN — TAMSULOSIN HYDROCHLORIDE 0.4 MILLIGRAM(S): 0.4 CAPSULE ORAL at 21:20

## 2025-07-10 RX ADMIN — LOSARTAN POTASSIUM 25 MILLIGRAM(S): 100 TABLET, FILM COATED ORAL at 05:36

## 2025-07-10 RX ADMIN — DULOXETINE 60 MILLIGRAM(S): 20 CAPSULE, DELAYED RELEASE ORAL at 11:33

## 2025-07-10 RX ADMIN — PREDNISONE 2.5 MILLIGRAM(S): 20 TABLET ORAL at 05:36

## 2025-07-10 RX ADMIN — CEFTRIAXONE 100 MILLIGRAM(S): 500 INJECTION, POWDER, FOR SOLUTION INTRAMUSCULAR; INTRAVENOUS at 11:32

## 2025-07-10 RX ADMIN — Medication 40 MILLIGRAM(S): at 05:36

## 2025-07-10 RX ADMIN — CALCIUM CARBONATE 1 TABLET(S): 750 TABLET ORAL at 11:33

## 2025-07-10 RX ADMIN — ENOXAPARIN SODIUM 40 MILLIGRAM(S): 100 INJECTION SUBCUTANEOUS at 05:36

## 2025-07-10 RX ADMIN — ATORVASTATIN CALCIUM 10 MILLIGRAM(S): 80 TABLET, FILM COATED ORAL at 21:20

## 2025-07-10 NOTE — PROGRESS NOTE ADULT - SUBJECTIVE AND OBJECTIVE BOX
[facility]  Collin Engle MD PhD, Kusum Barrera MD, Shobha Magdaleno MD, Todd Brown MD, Alfa Sanchez MD  and providing coverage with Claudia Varela MD  Providing Infectious Disease Consultations at [facility], formerly [facility], [facility], [facility], [facility]'s    Office# [phone] to schedule follow up appointments  Answering Service for urgent calls or New Consults [phone]  Cell# to text for urgent issues Collin Engle [phone]     infectious diseases progress note:    MATT SCHNEIDER is a 79y y. o. Male patient    Overnight and events of the last 24hrs reviewed    Allergies    No Known Allergies    Intolerances        ANTIBIOTICS/RELEVANT:  antimicrobials  cefTRIAXone   IVPB 2000 milliGRAM(s) IV Intermittent every 24 hours    immunologic:    OTHER:  acetaminophen     Tablet .. 650 milliGRAM(s) Oral every 6 hours PRN  aluminum hydroxide/magnesium hydroxide/simethicone Suspension 30 milliLiter(s) Oral every 4 hours PRN  atorvastatin 10 milliGRAM(s) Oral at bedtime  calcium carbonate   1250 mG (OsCal) 1 Tablet(s) Oral daily  DULoxetine 60 milliGRAM(s) Oral daily  enoxaparin Injectable 40 milliGRAM(s) SubCutaneous every 24 hours  losartan 25 milliGRAM(s) Oral daily  melatonin 3 milliGRAM(s) Oral at bedtime PRN  pantoprazole    Tablet 40 milliGRAM(s) Oral before breakfast  predniSONE   Tablet 2.5 milliGRAM(s) Oral daily  tamsulosin 0.4 milliGRAM(s) Oral at bedtime      Objective:  Vital Signs Last 24 Hrs  T(C): 36.3 (10 Jul 2025 05:09), Max: 36.9 (09 Jul 2025 12:32)  T(F): 97.4 (10 Jul 2025 05:09), Max: 98.4 (09 Jul 2025 12:32)  HR: 64 (10 Jul 2025 05:09) (54 - 72)  BP: 118/67 (10 Jul 2025 05:09) (118/67 - 128/73)  BP(mean): --  RR: 18 (10 Jul 2025 05:09) (18 - 18)  SpO2: 94% (10 Jul 2025 05:09) (93% - 95%)    Parameters below as of 10 Jul 2025 05:09  Patient On (Oxygen Delivery Method): room air        T(C): 36.3 (07-10-25 @ 05:09), Max: 37.2 (07-08-25 @ 16:00)  T(C): 36.3 (07-10-25 @ 05:09), Max: 37.2 (07-08-25 @ 16:00)  T(C): 36.3 (07-10-25 @ 05:09), Max: 37.2 (07-08-25 @ 16:00)    PHYSICAL EXAM:  HEENT: NC atraumatic  Neck: supple  Respiratory: no accessory muscle use, breathing comfortably  Cardiovascular: distant  Gastrointestinal: normal appearing, nondistended  Extremities: no clubbing, no cyanosis,        LABS:                          13.5   6.81  )-----------( 277      ( 10 Jul 2025 09:20 )             40.3       WBC  6.81 07-10 @ 09:20  10.84 07-09 @ 07:20  5.87 07-08 @ 06:15  5.59 07-07 @ 09:06  6.71 07-06 @ 08:00  6.27 07-05 @ 08:25      07-10    140  |  107  |  21  ----------------------------<  125[H]  3.7   |  26  |  1.10    Ca    8.6      10 Jul 2025 09:20        Creatinine: 1.10 mg/dL (07-10-25 @ 09:20)  Creatinine: 0.92 mg/dL (07-09-25 @ 07:20)  Creatinine: 1.10 mg/dL (07-08-25 @ 06:15)  Creatinine: 1.20 mg/dL (07-07-25 @ 09:06)  Creatinine: 1.20 mg/dL (07-06-25 @ 08:00)  Creatinine: 1.30 mg/dL (07-05-25 @ 08:25)        Urinalysis Basic - ( 10 Jul 2025 09:20 )    Color: x / Appearance: x / SG: x / pH: x  Gluc: 125 mg/dL / Ketone: x  / Bili: x / Urobili: x   Blood: x / Protein: x / Nitrite: x   Leuk Esterase: x / RBC: x / WBC x   Sq Epi: x / Non Sq Epi: x / Bacteria: x            INFLAMMATORY MARKERS      MICROBIOLOGY:              RADIOLOGY & ADDITIONAL STUDIES:

## 2025-07-10 NOTE — PROGRESS NOTE ADULT - NS ATTEND AMEND GEN_ALL_CORE FT
80 y/o M with PMH of HTN, non-obstructive CAD, GERD, PMR, and arthritis presented with right 2nd toe infection/cellulitis with concern for OM.       - Podiatry eval noted. MRI foot positive for OM.   - s/p Abx and fluid resuscitation in ED  - s/p amputation of right 2nd digit w/o cardiac complications    - Had a recent TTE in our office showing normal LVF, EF 61% with no significant valvular pathology  - Had a LHC in 2021 showing non-obstructive CAD  - No signs of significant ischemia or volume overload.   - cont losartan and statin   - Further cardiac workup will depend on clinical course.
78 y/o M with PMH of HTN, non-obstructive CAD, GERD, PMR, and arthritis presented with right 2nd toe infection/cellulitis with concern for OM.    We are called for cardiac optimization as patient may require amputation    - Podiatry eval noted. MRI foot positive for OM.   - s/p Abx and fluid resuscitation in ED  - s/p toe amputation 7/8, tolerated well.   - No evidence of volume overload, no cardiac ischemia, cardiac arrhythmias or significant valvular disease  - Had a recent TTE in our office showing normal LVF, EF 61% with no significant valvular pathology  - Had a LHC in 2021 showing non-obstructive CAD  - EKG showed NSR with septal Q waves, unchanged from prior.  - BP well controlled on Losartan  - Continue statin for non obstructive CAD

## 2025-07-10 NOTE — PROGRESS NOTE ADULT - PROBLEM SELECTOR PLAN 1
Patient evaluated and chart reviewed.  POD#2 s/p 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  No signs of post-operative infection, hematoma, or dehiscence at this time.  DSD reapplied with Aquacel Aq, gauze, and tere.  Surgical pathology report pending.  Continue IV abx per ID recs.  Podiatry will continue to follow while in-house.  Plan to be discussed with attending.

## 2025-07-10 NOTE — PROGRESS NOTE ADULT - SUBJECTIVE AND OBJECTIVE BOX
PROGRESS NOTE   Patient is a 79y old  Male who presents with a chief complaint of OM right second digit (08 Jul 2025 15:58)      HPI:  79m with PMR, CAD, GERD, HTN, HLD p/w foot/leg swelling.  Approximately 2 days ago he reports sustaining a cut to the toe #2 on his right foot..  He now has swelling to his right leg with foul-smelling discharge from his foot.  He denies any prior foot trauma.  He is not diabetic.  But he is on steroids.  Denies any cough fever chills.  He denies any cardiac respiratory symptoms.   (05 Jul 2025 15:17)      Vital Signs Last 24 Hrs  T(C): 36.3 (10 Jul 2025 05:09), Max: 36.9 (09 Jul 2025 12:32)  T(F): 97.4 (10 Jul 2025 05:09), Max: 98.4 (09 Jul 2025 12:32)  HR: 64 (10 Jul 2025 05:09) (54 - 72)  BP: 118/67 (10 Jul 2025 05:09) (118/67 - 128/73)  BP(mean): --  RR: 18 (10 Jul 2025 05:09) (18 - 18)  SpO2: 94% (10 Jul 2025 05:09) (93% - 95%)    Parameters below as of 10 Jul 2025 05:09  Patient On (Oxygen Delivery Method): room air                              13.3   10.84 )-----------( 299      ( 09 Jul 2025 07:20 )             40.6               07-09    137  |  105  |  23  ----------------------------<  120[H]  4.1   |  22  |  0.92    Ca    8.5      09 Jul 2025 07:20        PHYSICAL EXAM:  Vascular: Dorsalis Pedis and Posterior Tibial pulses 2/4.  Capillary re-fill time less then 3 seconds digits 1-5 left, 1,3-5 right.    Neuro: Protective sensation diminished to the level of the digits bilateral.  MSK: Muscle strength 5/5 all major muscle groups bilateral.  Dermatological: Incision site of the right foot noted with intact sutures, no dehiscence, mild karthikeyan-incision erythema, no proximal streaking, no fluctuance, no malodor, no signs of infection at this time. The region of concern is responding favorable to the current care plan.

## 2025-07-10 NOTE — PROGRESS NOTE ADULT - SUBJECTIVE AND OBJECTIVE BOX
Patient is a 79y old  Male who presents with a chief complaint of OM right second digit (08 Jul 2025 15:58)      INTERVAL HPI/OVERNIGHT EVENTS: noted  pt seen and examined this am   events noted  feels well      Vital Signs Last 24 Hrs  T(C): 36.3 (10 Jul 2025 05:09), Max: 36.6 (09 Jul 2025 21:01)  T(F): 97.4 (10 Jul 2025 05:09), Max: 97.8 (09 Jul 2025 21:01)  HR: 64 (10 Jul 2025 05:09) (54 - 64)  BP: 118/67 (10 Jul 2025 05:09) (118/67 - 128/73)  BP(mean): --  RR: 18 (10 Jul 2025 05:09) (18 - 18)  SpO2: 94% (10 Jul 2025 05:09) (94% - 95%)    Parameters below as of 10 Jul 2025 05:09  Patient On (Oxygen Delivery Method): room air        acetaminophen     Tablet .. 650 milliGRAM(s) Oral every 6 hours PRN  aluminum hydroxide/magnesium hydroxide/simethicone Suspension 30 milliLiter(s) Oral every 4 hours PRN  atorvastatin 10 milliGRAM(s) Oral at bedtime  calcium carbonate   1250 mG (OsCal) 1 Tablet(s) Oral daily  cefTRIAXone   IVPB 2000 milliGRAM(s) IV Intermittent every 24 hours  DULoxetine 60 milliGRAM(s) Oral daily  enoxaparin Injectable 40 milliGRAM(s) SubCutaneous every 24 hours  losartan 25 milliGRAM(s) Oral daily  melatonin 3 milliGRAM(s) Oral at bedtime PRN  pantoprazole    Tablet 40 milliGRAM(s) Oral before breakfast  predniSONE   Tablet 2.5 milliGRAM(s) Oral daily  tamsulosin 0.4 milliGRAM(s) Oral at bedtime      PHYSICAL EXAM:  GENERAL: NAD,   EYES: conjunctiva and sclera clear  ENMT: Moist mucous membranes  NECK: Supple, No JVD, Normal thyroid  CHEST/LUNG: non labored, cta b/l  HEART: Regular rate and rhythm; No murmurs, rubs, or gallops  ABDOMEN: Soft, Nontender, Nondistended; Bowel sounds present  EXTREMITIES:  rt foot dressing  LYMPH: No lymphadenopathy noted  SKIN: No rashes or lesions    Consultant(s) Notes Reviewed:  [x ] YES  [ ] NO  Care Discussed with Consultants/Other Providers [ x] YES  [ ] NO    LABS:                        13.5   6.81  )-----------( 277      ( 10 Jul 2025 09:20 )             40.3     07-10    140  |  107  |  21  ----------------------------<  125[H]  3.7   |  26  |  1.10    Ca    8.6      10 Jul 2025 09:20        Urinalysis Basic - ( 10 Jul 2025 09:20 )    Color: x / Appearance: x / SG: x / pH: x  Gluc: 125 mg/dL / Ketone: x  / Bili: x / Urobili: x   Blood: x / Protein: x / Nitrite: x   Leuk Esterase: x / RBC: x / WBC x   Sq Epi: x / Non Sq Epi: x / Bacteria: x      CAPILLARY BLOOD GLUCOSE            Urinalysis Basic - ( 10 Jul 2025 09:20 )    Color: x / Appearance: x / SG: x / pH: x  Gluc: 125 mg/dL / Ketone: x  / Bili: x / Urobili: x   Blood: x / Protein: x / Nitrite: x   Leuk Esterase: x / RBC: x / WBC x   Sq Epi: x / Non Sq Epi: x / Bacteria: x        Culture - Tissue with Gram Stain (collected 08 Jul 2025 16:00)  Source: Tissue Other, RIGHT SECOND TOE  Gram Stain (09 Jul 2025 06:36):    No polymorphonuclear cells seen per low power field    No organisms seen per oil power field  Preliminary Report (09 Jul 2025 21:36):    No growth    Culture - Acid Fast - Tissue w/Smear (collected 08 Jul 2025 16:00)  Source: Tissue Other, right foot    Culture - Tissue with Gram Stain (collected 08 Jul 2025 16:00)  Source: Tissue Other, RIGHT 2ND METATARSAL  Gram Stain (09 Jul 2025 06:37):    No polymorphonuclear cells seen per low power field    No organisms seen per oil power field  Preliminary Report (09 Jul 2025 21:35):    No growth    Culture - Wound Aerobic/Anaerobic (collected 08 Jul 2025 16:00)  Source: Surgical Swab Surgical Swab  Gram Stain (09 Jul 2025 07:46):    Few polymorphonuclear leukocytes per low power field    No organisms seen per oil power field  Preliminary Report (10 Jul 2025 10:10):    No growth to date.        RADIOLOGY & ADDITIONAL TESTS:    Imaging Personally Reviewed:  [x ] YES  [ ] NO

## 2025-07-10 NOTE — PROGRESS NOTE ADULT - SUBJECTIVE AND OBJECTIVE BOX
[facility] -- Jac Cunningham Pannella, Patel, Savella, Goodger, Cohen: Office # [phone]    Follow Up:  Cardiac optimization clearance     Subjective/Observations: No events overnight resting comfortably in bed.  No complaints of chest pain, dyspnea, or palpitations reported. No signs of orthopnea or PND.     REVIEW OF SYSTEMS: All other review of systems are negative unless indicated above    PAST MEDICAL & SURGICAL HISTORY:  Arthritis      GERD (gastroesophageal reflux disease)      PMR (polymyalgia rheumatica)  on steroids      FH: HTN (hypertension)      CAD (coronary artery disease)      Hiatal hernia      No significant past surgical history          MEDICATIONS  (STANDING):  atorvastatin 10 milliGRAM(s) Oral at bedtime  calcium carbonate   1250 mG (OsCal) 1 Tablet(s) Oral daily  cefTRIAXone   IVPB 2000 milliGRAM(s) IV Intermittent every 24 hours  DULoxetine 60 milliGRAM(s) Oral daily  enoxaparin Injectable 40 milliGRAM(s) SubCutaneous every 24 hours  losartan 25 milliGRAM(s) Oral daily  pantoprazole    Tablet 40 milliGRAM(s) Oral before breakfast  predniSONE   Tablet 2.5 milliGRAM(s) Oral daily  tamsulosin 0.4 milliGRAM(s) Oral at bedtime    MEDICATIONS  (PRN):  acetaminophen     Tablet .. 650 milliGRAM(s) Oral every 6 hours PRN Temp greater or equal to 38C (100.4F), Mild Pain (1 - 3)  aluminum hydroxide/magnesium hydroxide/simethicone Suspension 30 milliLiter(s) Oral every 4 hours PRN Dyspepsia  melatonin 3 milliGRAM(s) Oral at bedtime PRN Insomnia    Allergies    No Known Allergies    Intolerances      Vital Signs Last 24 Hrs  T(C): 36.3 (10 Jul 2025 05:09), Max: 36.9 (09 Jul 2025 12:32)  T(F): 97.4 (10 Jul 2025 05:09), Max: 98.4 (09 Jul 2025 12:32)  HR: 64 (10 Jul 2025 05:09) (54 - 72)  BP: 118/67 (10 Jul 2025 05:09) (118/67 - 128/73)  BP(mean): --  RR: 18 (10 Jul 2025 05:09) (18 - 18)  SpO2: 94% (10 Jul 2025 05:09) (93% - 95%)    Parameters below as of 10 Jul 2025 05:09  Patient On (Oxygen Delivery Method): room air      I&O's Summary    09 Jul 2025 07:01  -  10 Jul 2025 07:00  --------------------------------------------------------  IN: 50 mL / OUT: 500 mL / NET: -450 mL          TELE: Off cardiac monitor   PHYSICAL EXAM:  Constitutional: NAD, awake and alert  HEENT: Moist Mucous Membranes, Anicteric  Pulmonary: Non-labored, breath sounds are clear bilaterally, No wheezing, rales or rhonchi  Cardiovascular: Regular, S1 and S2, No murmurs, No rubs, gallops or clicks  Gastrointestinal:  soft, nontender, nondistended   Lymph: No peripheral edema. No lymphadenopathy.   Skin: No visible rashes or ulcers.  Psych:  Mood & affect appropriate      LABS: All Labs Reviewed:                        13.5   6.81  )-----------( 277      ( 10 Jul 2025 09:20 )             40.3                         13.3   10.84 )-----------( 299      ( 09 Jul 2025 07:20 )             40.6                         13.8   5.87  )-----------( 279      ( 08 Jul 2025 06:15 )             42.9     10 Jul 2025 09:20    140    |  107    |  21     ----------------------------<  125    3.7     |  26     |  1.10   09 Jul 2025 07:20    137    |  105    |  23     ----------------------------<  120    4.1     |  22     |  0.92   08 Jul 2025 06:15    138    |  108    |  15     ----------------------------<  114    4.2     |  26     |  1.10     Ca    8.6        10 Jul 2025 09:20  Ca    8.5        09 Jul 2025 07:20  Ca    9.0        08 Jul 2025 06:15         12 Lead ECG:   Ventricular Rate 66 BPM    Atrial Rate 66 BPM    P-R Interval 282 ms    QRS Duration 110 ms    Q-T Interval 402 ms    QTC Calculation(Bazett) 421 ms    P Axis 69 degrees    R Axis -14 degrees    T Axis -4 degrees    Diagnosis Line Sinus rhythm [id number] degree AV block  Septal infarct (cited on or before 02-JUN-2011)  Abnormal ECG (07-05-25 @ 10:04)

## 2025-07-11 ENCOUNTER — TRANSCRIPTION ENCOUNTER (OUTPATIENT)
Age: 80
End: 2025-07-11

## 2025-07-11 VITALS
HEART RATE: 64 BPM | SYSTOLIC BLOOD PRESSURE: 128 MMHG | RESPIRATION RATE: 18 BRPM | TEMPERATURE: 98 F | DIASTOLIC BLOOD PRESSURE: 76 MMHG | OXYGEN SATURATION: 95 %

## 2025-07-11 LAB
ANION GAP SERPL CALC-SCNC: 5 MMOL/L — SIGNIFICANT CHANGE UP (ref 5–17)
BUN SERPL-MCNC: 19 MG/DL — SIGNIFICANT CHANGE UP (ref 7–23)
CALCIUM SERPL-MCNC: 9.2 MG/DL — SIGNIFICANT CHANGE UP (ref 8.5–10.1)
CHLORIDE SERPL-SCNC: 105 MMOL/L — SIGNIFICANT CHANGE UP (ref 96–108)
CO2 SERPL-SCNC: 28 MMOL/L — SIGNIFICANT CHANGE UP (ref 22–31)
CREAT SERPL-MCNC: 1.1 MG/DL — SIGNIFICANT CHANGE UP (ref 0.5–1.3)
EGFR: 69 ML/MIN/1.73M2 — SIGNIFICANT CHANGE UP
EGFR: 69 ML/MIN/1.73M2 — SIGNIFICANT CHANGE UP
GLUCOSE SERPL-MCNC: 136 MG/DL — HIGH (ref 70–99)
GRAM STN FLD: ABNORMAL
HCT VFR BLD CALC: 44.4 % — SIGNIFICANT CHANGE UP (ref 39–50)
HGB BLD-MCNC: 14.2 G/DL — SIGNIFICANT CHANGE UP (ref 13–17)
MCHC RBC-ENTMCNC: 28.9 PG — SIGNIFICANT CHANGE UP (ref 27–34)
MCHC RBC-ENTMCNC: 32 G/DL — SIGNIFICANT CHANGE UP (ref 32–36)
MCV RBC AUTO: 90.4 FL — SIGNIFICANT CHANGE UP (ref 80–100)
NRBC # BLD AUTO: 0 K/UL — SIGNIFICANT CHANGE UP (ref 0–0)
NRBC # FLD: 0 K/UL — SIGNIFICANT CHANGE UP (ref 0–0)
NRBC BLD AUTO-RTO: 0 /100 WBCS — SIGNIFICANT CHANGE UP (ref 0–0)
PLATELET # BLD AUTO: 295 K/UL — SIGNIFICANT CHANGE UP (ref 150–400)
PMV BLD: 10.1 FL — SIGNIFICANT CHANGE UP (ref 7–13)
POTASSIUM SERPL-MCNC: 4.6 MMOL/L — SIGNIFICANT CHANGE UP (ref 3.5–5.3)
POTASSIUM SERPL-SCNC: 4.6 MMOL/L — SIGNIFICANT CHANGE UP (ref 3.5–5.3)
RBC # BLD: 4.91 M/UL — SIGNIFICANT CHANGE UP (ref 4.2–5.8)
RBC # FLD: 14.2 % — SIGNIFICANT CHANGE UP (ref 10.3–14.5)
SODIUM SERPL-SCNC: 138 MMOL/L — SIGNIFICANT CHANGE UP (ref 135–145)
SURGICAL PATHOLOGY STUDY: SIGNIFICANT CHANGE UP
WBC # BLD: 6.32 K/UL — SIGNIFICANT CHANGE UP (ref 3.8–10.5)
WBC # FLD AUTO: 6.32 K/UL — SIGNIFICANT CHANGE UP (ref 3.8–10.5)

## 2025-07-11 PROCEDURE — 97116 GAIT TRAINING THERAPY: CPT

## 2025-07-11 PROCEDURE — 71045 X-RAY EXAM CHEST 1 VIEW: CPT

## 2025-07-11 PROCEDURE — 93970 EXTREMITY STUDY: CPT

## 2025-07-11 PROCEDURE — 85730 THROMBOPLASTIN TIME PARTIAL: CPT

## 2025-07-11 PROCEDURE — 87186 SC STD MICRODIL/AGAR DIL: CPT

## 2025-07-11 PROCEDURE — 85652 RBC SED RATE AUTOMATED: CPT

## 2025-07-11 PROCEDURE — 99285 EMERGENCY DEPT VISIT HI MDM: CPT | Mod: 25

## 2025-07-11 PROCEDURE — 87077 CULTURE AEROBIC IDENTIFY: CPT

## 2025-07-11 PROCEDURE — 86900 BLOOD TYPING SEROLOGIC ABO: CPT

## 2025-07-11 PROCEDURE — 87206 SMEAR FLUORESCENT/ACID STAI: CPT

## 2025-07-11 PROCEDURE — 88304 TISSUE EXAM BY PATHOLOGIST: CPT

## 2025-07-11 PROCEDURE — 73718 MRI LOWER EXTREMITY W/O DYE: CPT

## 2025-07-11 PROCEDURE — 87641 MR-STAPH DNA AMP PROBE: CPT

## 2025-07-11 PROCEDURE — 86901 BLOOD TYPING SEROLOGIC RH(D): CPT

## 2025-07-11 PROCEDURE — 36415 COLL VENOUS BLD VENIPUNCTURE: CPT

## 2025-07-11 PROCEDURE — 87640 STAPH A DNA AMP PROBE: CPT

## 2025-07-11 PROCEDURE — 83605 ASSAY OF LACTIC ACID: CPT

## 2025-07-11 PROCEDURE — 97161 PT EVAL LOW COMPLEX 20 MIN: CPT

## 2025-07-11 PROCEDURE — 93005 ELECTROCARDIOGRAM TRACING: CPT

## 2025-07-11 PROCEDURE — 81003 URINALYSIS AUTO W/O SCOPE: CPT

## 2025-07-11 PROCEDURE — 85610 PROTHROMBIN TIME: CPT

## 2025-07-11 PROCEDURE — 87040 BLOOD CULTURE FOR BACTERIA: CPT

## 2025-07-11 PROCEDURE — 96375 TX/PRO/DX INJ NEW DRUG ADDON: CPT

## 2025-07-11 PROCEDURE — 86850 RBC ANTIBODY SCREEN: CPT

## 2025-07-11 PROCEDURE — 87116 MYCOBACTERIA CULTURE: CPT

## 2025-07-11 PROCEDURE — 85027 COMPLETE CBC AUTOMATED: CPT

## 2025-07-11 PROCEDURE — 80053 COMPREHEN METABOLIC PANEL: CPT

## 2025-07-11 PROCEDURE — 83036 HEMOGLOBIN GLYCOSYLATED A1C: CPT

## 2025-07-11 PROCEDURE — 87070 CULTURE OTHR SPECIMN AEROBIC: CPT

## 2025-07-11 PROCEDURE — 99232 SBSQ HOSP IP/OBS MODERATE 35: CPT

## 2025-07-11 PROCEDURE — 88311 DECALCIFY TISSUE: CPT

## 2025-07-11 PROCEDURE — 80048 BASIC METABOLIC PNL TOTAL CA: CPT

## 2025-07-11 PROCEDURE — 96374 THER/PROPH/DIAG INJ IV PUSH: CPT

## 2025-07-11 PROCEDURE — 73630 X-RAY EXAM OF FOOT: CPT

## 2025-07-11 PROCEDURE — 85025 COMPLETE CBC W/AUTO DIFF WBC: CPT

## 2025-07-11 PROCEDURE — 87015 SPECIMEN INFECT AGNT CONCNTJ: CPT

## 2025-07-11 PROCEDURE — 96376 TX/PRO/DX INJ SAME DRUG ADON: CPT

## 2025-07-11 PROCEDURE — 87075 CULTR BACTERIA EXCEPT BLOOD: CPT

## 2025-07-11 RX ORDER — LOSARTAN POTASSIUM 100 MG/1
25 TABLET, FILM COATED ORAL DAILY
Refills: 0 | Status: DISCONTINUED | OUTPATIENT
Start: 2025-07-11 | End: 2025-07-11

## 2025-07-11 RX ORDER — CEPHALEXIN 250 MG/1
1 CAPSULE ORAL
Qty: 20 | Refills: 0
Start: 2025-07-11 | End: 2025-07-15

## 2025-07-11 RX ADMIN — CEFTRIAXONE 100 MILLIGRAM(S): 500 INJECTION, POWDER, FOR SOLUTION INTRAMUSCULAR; INTRAVENOUS at 11:33

## 2025-07-11 RX ADMIN — PREDNISONE 2.5 MILLIGRAM(S): 20 TABLET ORAL at 05:32

## 2025-07-11 RX ADMIN — LOSARTAN POTASSIUM 25 MILLIGRAM(S): 100 TABLET, FILM COATED ORAL at 05:32

## 2025-07-11 RX ADMIN — CALCIUM CARBONATE 1 TABLET(S): 750 TABLET ORAL at 11:33

## 2025-07-11 RX ADMIN — ENOXAPARIN SODIUM 40 MILLIGRAM(S): 100 INJECTION SUBCUTANEOUS at 05:32

## 2025-07-11 RX ADMIN — Medication 40 MILLIGRAM(S): at 05:32

## 2025-07-11 RX ADMIN — DULOXETINE 60 MILLIGRAM(S): 20 CAPSULE, DELAYED RELEASE ORAL at 11:33

## 2025-07-11 NOTE — DISCHARGE NOTE NURSING/CASE MANAGEMENT/SOCIAL WORK - NSDCVIVACCINE_GEN_ALL_CORE_FT
Tdap; 03-Nov-2015 18:59; Lisa Izaguirre); [employer]; [id number]; IntraMuscular; Deltoid Right.; 0.5 milliLiter(s); VIS (VIS Published: 09-May-2013, VIS Presented: 03-Nov-2015);

## 2025-07-11 NOTE — DISCHARGE NOTE PROVIDER - NSDCCPCAREPLAN_GEN_ALL_CORE_FT
PRINCIPAL DISCHARGE DIAGNOSIS  Diagnosis: Right foot infection  Assessment and Plan of Treatment: p/w toe infection  w acute osteomyelitis  Podiatry and ID consulted  s/p amputation 7/8   path cw  cont abx  wound care  PMR  continue prednisone  LABS:                        14.2   6.32  )-----------( 295      ( 11 Jul 2025 09:15 )             44.4   07-11  138  |  105  |  19  ----------------------------<  136[H]  4.6   |  28  |  1.10  Ca    9.2      11 Jul 2025 09:15        SECONDARY DISCHARGE DIAGNOSES  Diagnosis: Chronic bilateral pleural effusions  Assessment and Plan of Treatment:      PRINCIPAL DISCHARGE DIAGNOSIS  Diagnosis: Right foot infection  Assessment and Plan of Treatment: p/w toe infection  w acute osteomyelitis  Podiatry and ID consulted  s/p amputation 7/8  seen by ID- path reviewed-cont abx till 7/15  wound care  PMR  continue prednisone  LABS:                        14.2   6.32  )-----------( 295      ( 11 Jul 2025 09:15 )             44.4   07-11  138  |  105  |  19  ----------------------------<  136[H]  4.6   |  28  |  1.10  Ca    9.2      11 Jul 2025 09:15        SECONDARY DISCHARGE DIAGNOSES  Diagnosis: Chronic bilateral pleural effusions  Assessment and Plan of Treatment:

## 2025-07-11 NOTE — PROGRESS NOTE ADULT - PROBLEM SELECTOR PROBLEM 1
Acute osteomyelitis
Acute osteomyelitis
Toe osteomyelitis
Toe osteomyelitis
Acute osteomyelitis
Acute osteomyelitis
Infection of toe

## 2025-07-11 NOTE — PROGRESS NOTE ADULT - SUBJECTIVE AND OBJECTIVE BOX
PROGRESS NOTE   Patient is a 79y old  Male who presents with a chief complaint of OM right second digit (08 Jul 2025 15:58 . PATIENT DENIES FEVER, CHILLS OR ANY OTHER PEDAL COMPLAINTS.      HPI:  79m with PMR, CAD, GERD, HTN, HLD p/w foot/leg swelling.  Approximately 2 days ago he reports sustaining a cut to the toe #2 on his right foot..  He now has swelling to his right leg with foul-smelling discharge from his foot.  He denies any prior foot trauma.  He is not diabetic.  But he is on steroids.  Denies any cough fever chills.  He denies any cardiac respiratory symptoms.   (05 Jul 2025 15:17)      Vital Signs Last 24 Hrs  T(C): 36.7 (11 Jul 2025 11:54), Max: 36.7 (11 Jul 2025 11:54)  T(F): 98 (11 Jul 2025 11:54), Max: 98 (11 Jul 2025 11:54)  HR: 64 (11 Jul 2025 11:54) (58 - 64)  BP: 128/76 (11 Jul 2025 11:54) (123/74 - 128/76)  BP(mean): --  RR: 18 (11 Jul 2025 11:54) (18 - 18)  SpO2: 95% (11 Jul 2025 11:54) (93% - 95%)    Parameters below as of 11 Jul 2025 11:54  Patient On (Oxygen Delivery Method): room air                              14.2   6.32  )-----------( 295      ( 11 Jul 2025 09:15 )             44.4               07-11    138  |  105  |  19  ----------------------------<  136[H]  4.6   |  28  |  1.10    Ca    9.2      11 Jul 2025 09:15          PHYSICAL EXAM:  Vascular: Dorsalis Pedis and Posterior Tibial pulses 2/4.  Capillary re-fill time less then 3 seconds digits 1-5 left, 1,3-5 right.    Neuro: Protective sensation diminished to the level of the digits bilateral.  MSK: Muscle strength 5/5 all major muscle groups bilateral.  Dermatological: Incision site of the right foot noted with intact sutures, no dehiscence, mild karthikeyan-incision erythema, no proximal streaking, no fluctuance, no malodor, no signs of infection at this time. The region of concern is responding favorable to the current care plan.

## 2025-07-11 NOTE — PROGRESS NOTE ADULT - ASSESSMENT
78 y/o M with PMH of HTN, non-obstructive CAD, GERD, PMR, and arthritis presented with right 2nd toe infection/cellulitis with concern for OM.    We are called for cardiac optimization as patient may require amputation    - Podiatry eval noted. MRI foot positive for OM.   - s/p Abx and fluid resuscitation in ED  - s/p amputation of right 2nd digit w/o cardiac complications    - Had a recent TTE in our office showing normal LVF, EF 61% with no significant valvular pathology  - Had a LHC in 2021 showing non-obstructive CAD  - EKG showed NSR with septal Q waves, unchanged from prior.      Nahid Vogel DNP, [facility]  Cardiology   Call Teams   
79m with PMR, CAD, GERD, HTN, HLD p/w foot/leg swelling.  Approximately 2 days ago he reports sustaining a cut to the toe #2 on his right foot..  He now has swelling to his right leg with foul-smelling discharge from his foot.  He denies any prior foot trauma.  He is not diabetic.  But he is on steroids.    Cellulitis and possible acute osteomyelitis right foot  pt with sig swelling ,drainage,  MR Foot No Cont, Right (07.05.25 @ 15:19) Acute osteomyelitis of the phalanges of the second toe.  MRSA/MSSA PCR (07.05.25 @ 17:55)    MRSA PCR Result.: NotDetec:   Staph aureus PCR Result: Detected  Culture - Wound Aerobic/Anaerobic (07.05.25 @ 09:59) - Numerous Streptococcus pyogenes (Group A) , Few Staphylococcus aureus  Numerous Actinomyces odontolyticus "Susceptibilities not performed"  Numerous Finegoldia magna "Susceptibilities not performed"  Amputation, toe, 1 toe 08-Jul-2025 15:57:22  Jose Ritter  Fillet flap procedure 08-Jul-2025 15:57:32  Jose Ritter  Path pending  Surgical micro -in lab  started Ceftriaxone 2 grams IV daily -continue for now but if path is negative fine to dc on  Keflex 500 mg PO QID with last day 7/15    Thank you for consulting us and involving us in the management of this most interesting and challenging case.  In addition to reviewing history, imaging, documents, labs, microbiology, took into account antibiotic stewardship, local antibiogram and infection control strategies and potential transmission issues.    We will follow along in the care of this patient. Please contact me by texting me directly on my cell# at [phone] using TEAMS or call our answering service at [phone] with any concerns.    Starting tomorrow Dr Shobha Magdaleno will be assuming care of this patient so please contact her with any questions, concerns or new micro data.      
79m with PMR, CAD, GERD, HTN, HLD p/w foot/leg swelling.  Approximately 2 days ago he reports sustaining a cut to the toe #2 on his right foot..  He now has swelling to his right leg with foul-smelling discharge from his foot.  He denies any prior foot trauma.  He is not diabetic.  But he is on steroids.    Cellulitis and possible acute osteomyelitis right foot  pt with sig swelling ,drainage,  MR Foot No Cont, Right (07.05.25 @ 15:19) Acute osteomyelitis of the phalanges of the second toe.  MRSA/MSSA PCR (07.05.25 @ 17:55)    MRSA PCR Result.: NotDetec:   Staph aureus PCR Result: Detected  Culture - Wound Aerobic/Anaerobic (07.05.25 @ 09:59) - Numerous Streptococcus pyogenes (Group A) now also reporting  Few Staphylococcus aureus  Numerous Actinomyces odontolyticus "Susceptibilities not performed"  Amputation, toe, 1 toe 08-Jul-2025 15:57:22  Jose Ritter  Fillet flap procedure 08-Jul-2025 15:57:32  Jose Ritter  started Ceftriaxone 2 grams IV daily -continue for now   Path pending  Surgical micro -in lab    Thank you for consulting us and involving us in the management of this most interesting and challenging case.  In addition to reviewing history, imaging, documents, labs, microbiology, took into account antibiotic stewardship, local antibiogram and infection control strategies and potential transmission issues.    We will follow along in the care of this patient. Please contact me by texting me directly on my cell# at [phone] using TEAMS or call our answering service at [phone] with any concerns.    
80 y/o M with PMH of HTN, non-obstructive CAD, GERD, PMR, and arthritis presented with right 2nd toe infection/cellulitis with concern for OM.    We are called for cardiac optimization as patient may require amputation    - Podiatry eval noted. MRI foot positive for OM.   - s/p Abx and fluid resuscitation in ED  - s/p amputation of right 2nd digit w/o cardiac complications    - Had a recent TTE in our office showing normal LVF, EF 61% with no significant valvular pathology  - Had a LHC in 2021 showing non-obstructive CAD  - EKG showed NSR with septal Q waves, unchanged from prior.      
 78 y/o M with PMH of HTN, non-obstructive CAD, GERD, PMR, and arthritis presented with right 2nd toe infection/cellulitis with concern for OM.  We are called for cardiac optimization as patient may require amputation    - Podiatry eval noted. MRI foot positive for OM.   - s/p Abx and fluid resuscitation in ED  - planned for amputation of Rt 2nd toe tomorrow  - No evidence of volume overload, no cardiac ischemia, cardiac arrhythmias or significant valvular disease  - Had a recent TTE in our office showing normal LVF, EF 61% with no significant valvular pathology  - Had a LHC in 2021 showing non-obstructive CAD  - EKG showed NSR with septal Q waves, unchanged from prior.  -optimized for planned low risk procedure
 80 y/o M with PMH of HTN, non-obstructive CAD, GERD, PMR, and arthritis presented with right 2nd toe infection/cellulitis with concern for OM.    We are called for cardiac optimization as patient may require amputation    - Podiatry eval noted. MRI foot positive for OM.   - s/p Abx and fluid resuscitation in ED  - planned for amputation of Rt 2nd toe today.   - No evidence of volume overload, no cardiac ischemia, cardiac arrhythmias or significant valvular disease  - Had a recent TTE in our office showing normal LVF, EF 61% with no significant valvular pathology  - Had a LHC in 2021 showing non-obstructive CAD  - EKG showed NSR with septal Q waves, unchanged from prior.  - optimized for planned low risk procedure  
79m with PMR, CAD, GERD, HTN, HLD p/w foot/leg swelling.  Approximately 2 days ago he reports sustaining a cut to the toe #2 on his right foot..  He now has swelling to his right leg with foul-smelling discharge from his foot.  He denies any prior foot trauma.  He is not diabetic.  But he is on steroids.    Cellulitis and possible acute osteomyelitis right foot  pt with sig swelling ,drainage,  MR Foot No Cont, Right (07.05.25 @ 15:19) Acute osteomyelitis of the phalanges of the second toe.  MRSA/MSSA PCR (07.05.25 @ 17:55)    MRSA PCR Result.: NotDetec:   Staph aureus PCR Result: Detected  Culture - Wound Aerobic/Anaerobic (07.05.25 @ 09:59) - Numerous Streptococcus pyogenes (Group A)  started Ceftriaxone 2 grams IV daily -continue for now   podiatry - surgical debridement of all non-viable soft tissue and bone planned for Tuesday 7/8    Thank you for consulting us and involving us in the management of this most interesting and challenging case.  In addition to reviewing history, imaging, documents, labs, microbiology, took into account antibiotic stewardship, local antibiogram and infection control strategies and potential transmission issues.    We will follow along in the care of this patient. Please contact me by texting me directly on my cell# at [phone] using TEAMS or call our answering service at [phone] with any concerns.    
79m with PMR, CAD, GERD, HTN, HLD p/w foot/leg swelling.  Approximately 2 days ago he reports sustaining a cut to the toe #2 on his right foot..  He now has swelling to his right leg with foul-smelling discharge from his foot.  He denies any prior foot trauma.  He is not diabetic.  But he is on steroids.    Cellulitis and possible acute osteomyelitis right foot  pt with sig swelling ,drainage,  MR Foot No Cont, Right (07.05.25 @ 15:19) Acute osteomyelitis of the phalanges of the second toe.  MRSA/MSSA PCR (07.05.25 @ 17:55)    MRSA PCR Result.: NotDetec:   Staph aureus PCR Result: Detected  Culture - Wound Aerobic/Anaerobic (07.05.25 @ 09:59) - Numerous Streptococcus pyogenes (Group A) now also reporting  Few Staphylococcus aureus  Numerous Actinomyces odontolyticus "Susceptibilities not performed"  started Ceftriaxone 2 grams IV daily -continue for now   podiatry - surgical debridement of all non-viable soft tissue and bone planned for Tuesday 7/8-TODAY    Thank you for consulting us and involving us in the management of this most interesting and challenging case.  In addition to reviewing history, imaging, documents, labs, microbiology, took into account antibiotic stewardship, local antibiogram and infection control strategies and potential transmission issues.    We will follow along in the care of this patient. Please contact me by texting me directly on my cell# at [phone] using TEAMS or call our answering service at [phone] with any concerns.    
Osteomyelitis right 2nd toe
s/p amputation of right 2nd digit
s/p amputation of right 2nd digit
 78 y/o M with PMH of HTN, non-obstructive CAD, GERD, PMR, and arthritis presented with right 2nd toe infection/cellulitis with concern for OM.    We are called for cardiac optimization as patient may require amputation    - Podiatry eval noted. MRI foot positive for OM.   - s/p Abx and fluid resuscitation in ED  - s/p amputation of right 2nd digit w/o cardiac complications    - Had a recent TTE in our office showing normal LVF, EF 61% with no significant valvular pathology  - Had a LHC in 2021 showing non-obstructive CAD  - EKG showed NSR with septal Q waves, unchanged from prior.      Nahid Vogel DNP, [facility]  Cardiology   Call Teams   
79m with PMR, CAD, GERD, HTN, HLD p/w foot/leg swelling.  Approximately 2 days ago he reports sustaining a cut to the toe #2 on his right foot..  He now has swelling to his right leg with foul-smelling discharge from his foot.  He denies any prior foot trauma.  He is not diabetic.  But he is on steroids.    Cellulitis and possible acute osteomyelitis right foot  pt with sig swelling ,drainage,  MR Foot No Cont, Right (07.05.25 @ 15:19) Acute osteomyelitis of the phalanges of the second toe.  MRSA/MSSA PCR (07.05.25 @ 17:55)    MRSA PCR Result.: NotDetec:   Staph aureus PCR Result: Detected  Culture - Wound Aerobic/Anaerobic (07.05.25 @ 09:59)    Gram Stain:   No polymorphonuclear leukocytes per low power field  No organisms seen per oil power field   Specimen Source: Swab Swab  started Ceftriaxone 2 grams IV daily -continue for now but coordination with podiatry for possible amputation    Thank you for consulting us and involving us in the management of this most interesting and challenging case.  In addition to reviewing history, imaging, documents, labs, microbiology, took into account antibiotic stewardship, local antibiogram and infection control strategies and potential transmission issues.    We will follow along in the care of this patient. Please contact me by texting me directly on my cell# at [phone] using TEAMS or call our answering service at [phone] with any concerns.    
79m with PMR, CAD, GERD, HTN, HLD p/w foot/leg swelling.  Approximately 2 days ago he reports sustaining a cut to the toe #2 on his right foot..  He now has swelling to his right leg with foul-smelling discharge from his foot.  He denies any prior foot trauma.  He is not diabetic.  But he is on steroids.    Cellulitis and possible acute osteomyelitis right foot  pt with sig swelling ,drainage,  MR Foot No Cont, Right (07.05.25 @ 15:19) Acute osteomyelitis of the phalanges of the second toe.  MRSA/MSSA PCR (07.05.25 @ 17:55)    MRSA PCR Result.: NotDetec:   Staph aureus PCR Result: Detected  Culture - Wound Aerobic/Anaerobic (07.05.25 @ 09:59) - Numerous Streptococcus pyogenes (Group A) now also reporting  Few Staphylococcus aureus  Numerous Actinomyces odontolyticus "Susceptibilities not performed"  Amputation, toe, 1 toe 08-Jul-2025 15:57:22  Jose Ritter  Fillet flap procedure 08-Jul-2025 15:57:32  Jose Ritter  started Ceftriaxone 2 grams IV daily -continue for now   Path pending  Surgical micro -in lab    Thank you for consulting us and involving us in the management of this most interesting and challenging case.  In addition to reviewing history, imaging, documents, labs, microbiology, took into account antibiotic stewardship, local antibiogram and infection control strategies and potential transmission issues.    We will follow along in the care of this patient. Please contact me by texting me directly on my cell# at [phone] using TEAMS or call our answering service at [phone] with any concerns.    
79m with PMR, CAD, GERD, HTN, HLD p/w toe infection  acute osteomyelitis  Podiatry and ID following  empiric abx  debridement on 7/8  medically acceptable for surgery    CAD/HTN/HLD  continue lipitor, losartan    BPH  continue flomax    GERD  continue protonix    PMR  continue prednisone
79m with PMR, CAD, GERD, HTN, HLD p/w toe infection  acute osteomyelitis  Podiatry and ID following  empiric abx  debridement on 7/8  medically acceptable for surgery    CAD/HTN/HLD  continue lipitor, losartan    BPH  continue flomax    GERD  continue protonix    PMR  continue prednisone
79m with PMR, CAD, GERD, HTN, HLD p/w toe infection  acute osteomyelitis  Podiatry and ID following  empiric abx  s/p amputation 7/8  f/u path    CAD/HTN/HLD  continue lipitor, losartan    BPH  continue flomax    GERD  continue protonix    PMR  continue prednisone
79m with PMR, CAD, GERD, HTN, HLD p/w toe infection  acute osteomyelitis  Podiatry and ID following  empiric abx  s/p amputation 7/8  f/u path    CAD/HTN/HLD  continue lipitor, losartan    BPH  continue flomax    GERD  continue protonix    PMR  continue prednisone    [employer]   [phone]
79m with PMR, CAD, GERD, HTN, HLD p/w toe infection  acute osteomyelitis  Podiatry and ID following  empiric abx  s/p amputation 7/8  medically acceptable for surgery    CAD/HTN/HLD  continue lipitor, losartan    BPH  continue flomax    GERD  continue protonix    PMR  continue prednisone
Right 2nd digit infection  RLE cellulitis 
normal post operative course 
s/p amputation of right 2nd digit
right 2nd toe infection, OM

## 2025-07-11 NOTE — DISCHARGE NOTE NURSING/CASE MANAGEMENT/SOCIAL WORK - FINANCIAL ASSISTANCE
[facility] provides services at a reduced cost to those who are determined to be eligible through [facility]’s financial assistance program. Information regarding [facility]’s financial assistance program can be found by going to [email or web address] or by calling [phone].

## 2025-07-11 NOTE — DISCHARGE NOTE PROVIDER - NSDCMRMEDTOKEN_GEN_ALL_CORE_FT
Caltrate 600 mg oral tablet: 2 tab(s) orally once a day  Co Q-10 100 mg oral capsule: 1 cap(s) orally once a day PM  DULoxetine 60 mg oral delayed release capsule: 1 cap(s) orally once a day  ergocalciferol 1.25 mg (50,000 intl units) oral capsule: 1 cap(s) orally every 7 days  Folbic oral tablet: 1 tab(s) orally once a day  Lipitor 10 mg oral tablet: 1 tab(s) orally once a day (at bedtime)  losartan 25 mg oral tablet: 1 tab(s) orally once a day  predniSONE 2.5 mg oral tablet: 1 tab(s) orally once a day  RABEprazole 20 mg oral tablet, extended release: 20 milligram(s) orally 2 times a day  silodosin 8 mg oral capsule: 1 cap(s) orally once a day (at bedtime)   Caltrate 600 mg oral tablet: 2 tab(s) orally once a day  cephalexin 500 mg oral capsule: 1 cap(s) orally 4 times a day  DULoxetine 60 mg oral delayed release capsule: 1 cap(s) orally once a day  ergocalciferol 1.25 mg (50,000 intl units) oral capsule: 1 cap(s) orally every 7 days  Folbic oral tablet: 1 tab(s) orally once a day  Lipitor 10 mg oral tablet: 1 tab(s) orally once a day (at bedtime)  losartan 25 mg oral tablet: 1 tab(s) orally once a day  predniSONE 2.5 mg oral tablet: 1 tab(s) orally once a day  RABEprazole 20 mg oral tablet, extended release: 20 milligram(s) orally 2 times a day  silodosin 8 mg oral capsule: 1 cap(s) orally once a day (at bedtime)

## 2025-07-11 NOTE — DISCHARGE NOTE PROVIDER - HOSPITAL COURSE
79m with PMR, CAD, GERD, HTN, HLD p/w toe infection  w acute osteomyelitis  Podiatry and ID consulted  s/p amputation 7/8   path cw  cont abx      PMR  continue prednisone        LABS:                        14.2   6.32  )-----------( 295      ( 11 Jul 2025 09:15 )             44.4     07-11    138  |  105  |  19  ----------------------------<  136[H]  4.6   |  28  |  1.10    Ca    9.2      11 Jul 2025 09:15         79m with PMR, CAD, GERD, HTN, HLD p/w toe infection  w acute osteomyelitis  Podiatry and ID consulted  s/p amputation 7/8   path cw  cont abx  fu podiatry in the office for dressing changes      PMR  continue prednisone        LABS:                        14.2   6.32  )-----------( 295      ( 11 Jul 2025 09:15 )             44.4     07-11    138  |  105  |  19  ----------------------------<  136[H]  4.6   |  28  |  1.10    Ca    9.2      11 Jul 2025 09:15

## 2025-07-11 NOTE — DISCHARGE NOTE NURSING/CASE MANAGEMENT/SOCIAL WORK - PATIENT PORTAL LINK FT
You can access the FollowMyHealth Patient Portal offered by [facility] by registering at the following website: [email or web address]. By joining [employer]’s FollowMyHealth portal, you will also be able to view your health information using other applications (apps) compatible with our system.

## 2025-07-11 NOTE — CASE MANAGEMENT PROGRESS NOTE - NSCMPROGRESSNOTE_GEN_ALL_CORE
Discussed patient in interdisciplinary rounds.  Patient is POD 3 right 2nd toe amp.  Patient remains on IV rocephin pending ID recs.  Patient to follow with podiatry for dressing changes.  Will monitor for transition needs and remain available

## 2025-07-11 NOTE — DISCHARGE NOTE PROVIDER - CARE PROVIDER_API CALL
Roya Celeste  Internal Medicine  [address] [place] [address], [address]  [place] [address]  Phone: [phone]  Fax: [phone]  Follow Up Time: 2 weeks   Roya Celeste  Internal Medicine  [address] [place] [address], [address]  [place] [address]  Phone: [phone]  Fax: [phone]  Follow Up Time: 2 weeks    Jose Ritter  Surgery Podiatric  [address] [facility], [place] [address]  Phone: [phone]  Fax: [phone]  Follow Up Time: 1 week

## 2025-07-11 NOTE — PROGRESS NOTE ADULT - SUBJECTIVE AND OBJECTIVE BOX
[facility]  Collin Engle MD PhD, Kusum Barrera MD, Shobha Magdaleno MD, Todd Brown MD, Alfa Sanchez MD  and providing coverage with Claudia Varela MD  Providing Infectious Disease Consultations at [facility], [facility], [facility], [facility]    Office# [phone] to schedule follow up appointments  Answering Service for urgent calls or New Consults [phone]  Cell# to text for urgent issues Collin Engle [phone]     infectious diseases progress note:    MATT SCHNEIDER is a 79y y. o. Male patient    Overnight and events of the last 24hrs reviewed    Allergies    No Known Allergies    Intolerances        ANTIBIOTICS/RELEVANT:  antimicrobials  cefTRIAXone   IVPB 2000 milliGRAM(s) IV Intermittent every 24 hours    immunologic:    OTHER:  acetaminophen     Tablet .. 650 milliGRAM(s) Oral every 6 hours PRN  aluminum hydroxide/magnesium hydroxide/simethicone Suspension 30 milliLiter(s) Oral every 4 hours PRN  atorvastatin 10 milliGRAM(s) Oral at bedtime  calcium carbonate   1250 mG (OsCal) 1 Tablet(s) Oral daily  DULoxetine 60 milliGRAM(s) Oral daily  enoxaparin Injectable 40 milliGRAM(s) SubCutaneous every 24 hours  losartan 25 milliGRAM(s) Oral daily  melatonin 3 milliGRAM(s) Oral at bedtime PRN  pantoprazole    Tablet 40 milliGRAM(s) Oral before breakfast  predniSONE   Tablet 2.5 milliGRAM(s) Oral daily  tamsulosin 0.4 milliGRAM(s) Oral at bedtime      Objective:  Vital Signs Last 24 Hrs  T(C): 36.4 (11 Jul 2025 05:06), Max: 36.5 (10 Jul 2025 12:00)  T(F): 97.5 (11 Jul 2025 05:06), Max: 97.7 (10 Jul 2025 12:00)  HR: 61 (11 Jul 2025 05:06) (57 - 61)  BP: 123/74 (11 Jul 2025 05:06) (123/74 - 126/69)  BP(mean): --  RR: 18 (11 Jul 2025 05:06) (18 - 18)  SpO2: 93% (11 Jul 2025 05:06) (92% - 95%)    Parameters below as of 11 Jul 2025 05:06  Patient On (Oxygen Delivery Method): room air        T(C): 36.4 (07-11-25 @ 05:06), Max: 36.9 (07-09-25 @ 12:32)  T(C): 36.4 (07-11-25 @ 05:06), Max: 37.2 (07-08-25 @ 16:00)  T(C): 36.4 (07-11-25 @ 05:06), Max: 37.2 (07-08-25 @ 16:00)    PHYSICAL EXAM:  HEENT: NC atraumatic  Neck: supple  Respiratory: no accessory muscle use, breathing comfortably  Cardiovascular: distant  Gastrointestinal: normal appearing, nondistended  Extremities: no clubbing, no cyanosis,        LABS:                          14.2   6.32  )-----------( 295      ( 11 Jul 2025 09:15 )             44.4       WBC  6.32 07-11 @ 09:15  6.81 07-10 @ 09:20  10.84 07-09 @ 07:20  5.87 07-08 @ 06:15  5.59 07-07 @ 09:06  6.71 07-06 @ 08:00  6.27 07-05 @ 08:25      07-11    138  |  105  |  19  ----------------------------<  136[H]  4.6   |  28  |  1.10    Ca    9.2      11 Jul 2025 09:15        Creatinine: 1.10 mg/dL (07-11-25 @ 09:15)  Creatinine: 1.10 mg/dL (07-10-25 @ 09:20)  Creatinine: 0.92 mg/dL (07-09-25 @ 07:20)  Creatinine: 1.10 mg/dL (07-08-25 @ 06:15)  Creatinine: 1.20 mg/dL (07-07-25 @ 09:06)  Creatinine: 1.20 mg/dL (07-06-25 @ 08:00)  Creatinine: 1.30 mg/dL (07-05-25 @ 08:25)        Urinalysis Basic - ( 11 Jul 2025 09:15 )    Color: x / Appearance: x / SG: x / pH: x  Gluc: 136 mg/dL / Ketone: x  / Bili: x / Urobili: x   Blood: x / Protein: x / Nitrite: x   Leuk Esterase: x / RBC: x / WBC x   Sq Epi: x / Non Sq Epi: x / Bacteria: x            INFLAMMATORY MARKERS      MICROBIOLOGY:    Culture - Tissue with Gram Stain (07.08.25 @ 16:00)    Gram Stain:   No polymorphonuclear cells seen per low power field  No organisms seen per oil power field   Specimen Source: Tissue Other, RIGHT 2ND METATARSAL   Culture Results:   No growth    Culture - Tissue with Gram Stain (07.08.25 @ 16:00)    Gram Stain:   No polymorphonuclear cells seen per low power field  No organisms seen per oil power field   Specimen Source: Tissue Other, RIGHT SECOND TOE   Culture Results:   No growth    Culture - Wound Aerobic/Anaerobic (07.05.25 @ 09:59)    Gram Stain:   No polymorphonuclear leukocytes per low power field  No organisms seen per oil power field   -  Clindamycin: R <=0.25 This isolate is presumed to be clindamycin resistant based on detection of inducible resistance. Clindamycin may still be effective in some patients.   -  Erythromycin: R >4   -  Gentamicin: S <=4 Should not be used as monotherapy   -  Oxacillin: S <=0.25 Oxacillin predicts susceptibility for dicloxacillin, methicillin, and nafcillin   -  Penicillin: R 2   -  Rifampin: S <=1 Should not be used as monotherapy   -  Tetracycline: S <=4   -  Trimethoprim/Sulfamethoxazole: S <=0.5/9.5   -  Vancomycin: S 1   Specimen Source: Swab Swab   Culture Results:   Numerous Streptococcus pyogenes (Group A)  Penicillin and ampicillin are drugs of choice for  treatment of beta-hemolytic streptococcal infections.  Susceptibility testing is not performed routinely because  S. pyogenes (GAS) is universally susceptible to penicillin  and resistance in other strains is extremely rare.  Few Staphylococcus aureus  Numerous Actinomyces odontolyticus "Susceptibilities not performed"  Numerous Finegoldia magna "Susceptibilities not performed"  Mixed Anaerobic Mariangel   Organism Identification: Staphylococcus aureus   Organism: Staphylococcus aureus   Method Type: DAVEY        RADIOLOGY & ADDITIONAL STUDIES:

## 2025-07-11 NOTE — PROGRESS NOTE ADULT - PROBLEM SELECTOR PLAN 1
Patient evaluated and chart reviewed.  s/p 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  No signs of post-operative infection, hematoma, or dehiscence at this time.  DSD reapplied with Aquacel Aq, gauze, and Cling.  Continue IV abx per ID recs.  Podiatry will continue to follow while in-house.  Plan to be discussed with attending.

## 2025-07-11 NOTE — PROGRESS NOTE ADULT - PROVIDER SPECIALTY LIST ADULT
Hospitalist
Internal Medicine
Cardiology
Hospitalist
Infectious Disease
Cardiology
Cardiology
Infectious Disease
Podiatry

## 2025-07-11 NOTE — CASE MANAGEMENT PROGRESS NOTE - NSCMPROGRESSNOTE_GEN_ALL_CORE
Per MD pt is medically cleared for discharge today 7/11/25. CM met with patient / daughter Akosua / son Esvin to discuss transition of care. Pt gave verbal consent to speak with family at bedside. Pt is to be transitioned to home with no formal home care services, pt offered home care services, pt declined home care services. CM explained home care services, expectations, process, insurance provisions and home safety with pt verbalizing understanding.  Pt states he is going to recover at his son Manan home @ [facility] and his family will assist post discharge as necessary. Pt daughter stated she would assist pt with wound care as necessary. Pt aware of plan and in agreement / verbalizes understanding. IMM discussed / given. Pt verbalized understanding. Confirmed pharmacy is [facility] Wilson. community MD is Dr. Celeste. Pt stated they would make their own follow up appointments and family confirmed they would assist as necessary. Per pt DMEs in home include cane. Pt denies need for further DME. Pt stated his family will transport pt home. All questions answered. CM discussed plan with MD and RN. CM to remain available through hospitalization.

## 2025-07-11 NOTE — PROGRESS NOTE ADULT - SUBJECTIVE AND OBJECTIVE BOX
[facility] - Jac Cunningham, Quoc, Kevin, Mina, Mati Arboleda  Office Number:  [phone]    Patient resting comfortably in bed in NAD.  Laying flat with no respiratory distress.  No complaints of chest pain, dyspnea, palpitations, PND, or orthopnea.    F/U for:  HTN, CAD    MEDICATIONS  (STANDING):  atorvastatin 10 milliGRAM(s) Oral at bedtime  calcium carbonate   1250 mG (OsCal) 1 Tablet(s) Oral daily  cefTRIAXone   IVPB 2000 milliGRAM(s) IV Intermittent every 24 hours  DULoxetine 60 milliGRAM(s) Oral daily  enoxaparin Injectable 40 milliGRAM(s) SubCutaneous every 24 hours  losartan 25 milliGRAM(s) Oral daily  pantoprazole    Tablet 40 milliGRAM(s) Oral before breakfast  predniSONE   Tablet 2.5 milliGRAM(s) Oral daily  tamsulosin 0.4 milliGRAM(s) Oral at bedtime    MEDICATIONS  (PRN):  acetaminophen     Tablet .. 650 milliGRAM(s) Oral every 6 hours PRN Temp greater or equal to 38C (100.4F), Mild Pain (1 - 3)  aluminum hydroxide/magnesium hydroxide/simethicone Suspension 30 milliLiter(s) Oral every 4 hours PRN Dyspepsia  melatonin 3 milliGRAM(s) Oral at bedtime PRN Insomnia      Allergies    No Known Allergies    Intolerances        Vital Signs Last 24 Hrs  T(C): 36.4 (11 Jul 2025 05:06), Max: 36.5 (10 Jul 2025 12:00)  T(F): 97.5 (11 Jul 2025 05:06), Max: 97.7 (10 Jul 2025 12:00)  HR: 61 (11 Jul 2025 05:06) (57 - 61)  BP: 123/74 (11 Jul 2025 05:06) (123/74 - 126/69)  BP(mean): --  RR: 18 (11 Jul 2025 05:06) (18 - 18)  SpO2: 93% (11 Jul 2025 05:06) (92% - 95%)    Parameters below as of 11 Jul 2025 05:06  Patient On (Oxygen Delivery Method): room air        I&O's Summary      ON EXAM:    Constitutional: NAD, awake and alert  HEENT: Moist Mucous Membranes, Anicteric  Pulmonary: Non-labored, breath sounds are clear bilaterally, No wheezing, rales or rhonchi  Cardiovascular: Regular, S1 and S2, No murmurs, No rubs, gallops or clicks  Gastrointestinal:  soft, nontender, nondistended   Lymph: No peripheral edema. No lymphadenopathy.   Skin: No visible rashes or ulcers.  Psych:  Mood & affect appropriate    LABS: All Labs Reviewed:                        14.2   6.32  )-----------( 295      ( 11 Jul 2025 09:15 )             44.4                         13.5   6.81  )-----------( 277      ( 10 Jul 2025 09:20 )             40.3                         13.3   10.84 )-----------( 299      ( 09 Jul 2025 07:20 )             40.6     11 Jul 2025 09:15    138    |  105    |  19     ----------------------------<  136    4.6     |  28     |  1.10   10 Jul 2025 09:20    140    |  107    |  21     ----------------------------<  125    3.7     |  26     |  1.10   09 Jul 2025 07:20    137    |  105    |  23     ----------------------------<  120    4.1     |  22     |  0.92     Ca    9.2        11 Jul 2025 09:15  Ca    8.6        10 Jul 2025 09:20  Ca    8.5        09 Jul 2025 07:20            Blood Culture: Organism --  Gram Stain Blood -- Gram Stain   Few polymorphonuclear leukocytes per low power field  No organisms seen per oil power field  Specimen Source Tissue Other, right foot  Culture-Blood --

## 2025-07-13 LAB
CULTURE RESULTS: SIGNIFICANT CHANGE UP
CULTURE RESULTS: SIGNIFICANT CHANGE UP
SPECIMEN SOURCE: SIGNIFICANT CHANGE UP
SPECIMEN SOURCE: SIGNIFICANT CHANGE UP

## 2025-07-14 LAB
CULTURE RESULTS: ABNORMAL
SPECIMEN SOURCE: SIGNIFICANT CHANGE UP

## 2025-08-05 ENCOUNTER — RX RENEWAL (OUTPATIENT)
Age: 80
End: 2025-08-05

## (undated) DEVICE — VENODYNE/SCD SLEEVE CALF MEDIUM

## (undated) DEVICE — DRSG TELFA 3 X 8

## (undated) DEVICE — WARMING BLANKET UPPER ADULT

## (undated) DEVICE — SOL INJ NS 0.9% 1000ML

## (undated) DEVICE — PLV/PSP-TOURNIQUET #3 3010JABW: Type: DURABLE MEDICAL EQUIPMENT

## (undated) DEVICE — CATH IV SAFE INSYTE 14G X 1.75" (ORANGE)

## (undated) DEVICE — FLOORMAT SURGISAFE ABSORBANT WHITE 36" X 72"

## (undated) DEVICE — SPECIMEN CONTAINER 4OZ

## (undated) DEVICE — BLADE SCALPEL SAFETYLOCK #15

## (undated) DEVICE — DRAPE 3/4 SHEET W REINFORCEMENT 56X77"

## (undated) DEVICE — WOUND IRR IRRISEPT W 0.5 CHG

## (undated) DEVICE — SUT MONOSOF 3-0 18" P-14

## (undated) DEVICE — Device

## (undated) DEVICE — SYR LUER LOK 30CC

## (undated) DEVICE — DRSG ACE BANDAGE 4"

## (undated) DEVICE — ELCTR BOVIE PENCIL SMOKE EVACUATION

## (undated) DEVICE — DRSG ADAPTIC 3 X 3"

## (undated) DEVICE — PREP BETADINE KIT

## (undated) DEVICE — SYR LUER LOK 10CC

## (undated) DEVICE — PACKING GAUZE PLAIN 0.5"

## (undated) DEVICE — PLV-SCD MACHINE: Type: DURABLE MEDICAL EQUIPMENT

## (undated) DEVICE — SAW BLADE LINVATEC SAGITTAL MIC 9.5X25.5X0.4MM

## (undated) DEVICE — VENODYNE/SCD SLEEVE CALF LARGE

## (undated) DEVICE — PACK LOWER EXTREMITY NS PLAINVI

## (undated) DEVICE — TOURNIQUET CUFF 18" DUAL PORT SINGLE BLADDER W PLC  (BLACK)